# Patient Record
Sex: FEMALE | Race: WHITE | NOT HISPANIC OR LATINO | Employment: OTHER | URBAN - NONMETROPOLITAN AREA
[De-identification: names, ages, dates, MRNs, and addresses within clinical notes are randomized per-mention and may not be internally consistent; named-entity substitution may affect disease eponyms.]

---

## 2017-05-13 PROBLEM — Z88.9 DRUG ALLERGY, MULTIPLE: Status: ACTIVE | Noted: 2017-05-13

## 2017-05-13 PROBLEM — R19.7 DIARRHEA: Status: ACTIVE | Noted: 2017-05-13

## 2017-08-18 PROBLEM — R79.89 ABNORMAL TSH: Status: ACTIVE | Noted: 2017-08-18

## 2017-08-18 PROBLEM — I25.2 HISTORY OF MI (MYOCARDIAL INFARCTION): Status: ACTIVE | Noted: 2017-08-18

## 2017-08-18 PROBLEM — E55.9 VITAMIN D INSUFFICIENCY: Status: ACTIVE | Noted: 2017-08-18

## 2017-11-15 PROBLEM — F02.80 LATE ONSET ALZHEIMER'S DISEASE WITHOUT BEHAVIORAL DISTURBANCE (HCC): Status: ACTIVE | Noted: 2017-11-15

## 2017-11-15 PROBLEM — G30.1 LATE ONSET ALZHEIMER'S DISEASE WITHOUT BEHAVIORAL DISTURBANCE (HCC): Status: ACTIVE | Noted: 2017-11-15

## 2018-02-06 PROBLEM — M19.90 OSTEOARTHRITIS: Status: ACTIVE | Noted: 2018-02-06

## 2018-03-16 ENCOUNTER — OFFICE VISIT (OUTPATIENT)
Dept: CARDIOLOGY | Facility: CLINIC | Age: 83
End: 2018-03-16
Payer: MEDICARE

## 2018-03-16 VITALS
SYSTOLIC BLOOD PRESSURE: 130 MMHG | HEART RATE: 82 BPM | DIASTOLIC BLOOD PRESSURE: 60 MMHG | WEIGHT: 117 LBS | OXYGEN SATURATION: 93 % | HEIGHT: 64 IN | BODY MASS INDEX: 19.97 KG/M2

## 2018-03-16 DIAGNOSIS — R00.2 PALPITATIONS: ICD-10-CM

## 2018-03-16 DIAGNOSIS — F17.210 CIGARETTE SMOKER: ICD-10-CM

## 2018-03-16 DIAGNOSIS — I10 ESSENTIAL HYPERTENSION: ICD-10-CM

## 2018-03-16 PROCEDURE — 99406 BEHAV CHNG SMOKING 3-10 MIN: CPT | Performed by: INTERNAL MEDICINE

## 2018-03-16 PROCEDURE — 93000 ELECTROCARDIOGRAM COMPLETE: CPT | Performed by: INTERNAL MEDICINE

## 2018-03-16 PROCEDURE — 99205 OFFICE O/P NEW HI 60 MIN: CPT | Mod: 25 | Performed by: INTERNAL MEDICINE

## 2018-03-16 ASSESSMENT — ENCOUNTER SYMPTOMS
LOSS OF CONSCIOUSNESS: 0
PND: 0
DEPRESSION: 0
SHORTNESS OF BREATH: 0
PALPITATIONS: 1
ORTHOPNEA: 0
FALLS: 0
ABDOMINAL PAIN: 0
DIZZINESS: 0
DIAPHORESIS: 1

## 2018-03-16 NOTE — LETTER
"     [facility] [address],   [address]nd [place] [address]  Phone: [phone]  Fax: [phone]              Brittney Bruno  11/23/1933    Encounter Date: 3/16/2018    Brandi Raymundo M.D.          PROGRESS NOTE:  Subjective:   Brittney Bruno is a 84 y.o. female referred to cardiology clinic for management of her hypertension. Patient reports having sudden diaphoretic episodes which she used to have previously when her blood pressure was uncontrolled. She was seen at the ER recently once for abdominal discomfort and the other time for infectious symptoms. At that time her blood pressure was in the 160s systolic. She was told that her antihypertensives were not working. Since then her blood pressure at home has been mostly 120s systolic, occasionally 130s to 140s systolic. She is requesting that her metoprolol be changed to another medication.    She has a history of an MI in 2004 at which time she had a stent placed to the RCA.  No chest discomfort.    She is a current smoker and has been smoking for 67 years. Currently smokes 3 cigarettes a day and is not interested in quitting at this time.    When she has the above-mentioned diaphoresis, she reports not feeling well. Possible palpitations. No dyspnea. No clear triggers for her symptoms. No alleviating factors.    She has hyperlipidemia and is intolerant to statins.     Chief Complaint   Patient presents with   • New Patient     wants to change b/p meds         Past Medical History:   Diagnosis Date   • Arthritis     degenerative osteoarthritis and osteoporosis   • CATARACT 2001    bilateral IOL   • Chronic UTI 11/7/2012   • Dental disorder     dentures   • Heart valve disease     \"leaky valves\"   • Hepatitis C    • Hypertension    • Myocardial infarct 2002   • Pain     neck and back pain   • Renal disorder 2006    stone   • Sciatica 11/7/2012     Past Surgical History:   Procedure " Laterality Date   • CERVICAL DISK AND FUSION ANTERIOR  9/15/2010    Performed by TERESA MALDONADO at SURGERY [facility] ORS   • BUNIONECTOMY  1989    left   • TUBAL REANASTOMOSIS  1968    unsuccessful   • TUBAL COAGULATION LAPAROSCOPIC BILATERAL  1963   • TONSILLECTOMY  1955   • COLON RESECTION     • OTHER      colon tumors removed   • OTHER ABDOMINAL SURGERY  1999, 1998    hernia   • OTHER ORTHOPEDIC SURGERY       Family History   Problem Relation Age of Onset   • Stroke Mother    • Cancer Sister      History   Smoking Status   • Current Every Day Smoker   • Packs/day: 0.25   • Types: Cigarettes   Smokeless Tobacco   • Never Used     Allergies   Allergen Reactions   • Amitriptyline Rash, Itching and Vomiting   • Darvocet [Propoxyphene N-Apap] Rash, Itching and Vomiting   • Demerol Rash, Itching and Vomiting   • Fentanyl Itching   • Morphine Rash, Itching and Vomiting   • Penicillin G Swelling   • Percocet [Oxycodone-Acetaminophen] Itching and Vomiting   • Sulfamethoxazole W-Trimethoprim Anaphylaxis   • Ultracet [Tramadol-Acetaminophen] Itching and Vomiting   • Ultram [Tramadol Hcl] Rash, Itching and Vomiting   • Vicodin [Hydrocodone-Acetaminophen] Rash, Itching and Vomiting   • Fosamax      Jaw problems   • Codeine Itching   • Dilaudid [Hydromorphone Hcl] Itching   • Imodium [Loperamide] Diarrhea     Upset stomach and diarrhea   • Milk Products Food Allergy    • Motrin [Ibuprofen]    • Sulfa Drugs      Outpatient Encounter Prescriptions as of 3/16/2018   Medication Sig Dispense Refill   • cetirizine-psuedoephedrine (ZYRTEC-D ALLERGY & CONGESTION) 5-120 MG per tablet Take 1 Tab by mouth 2 times a day. 30 Tab 0   • busPIRone (BUSPAR) 10 MG Tab TAKE 1 TABLET BY MOUTH TWICE DAILY 60 Tab 2   • VOLTAREN 1 % Gel Apply 2 to 4 grams to affected area 4 times daily 200 g 5   • lidocaine (LIDODERM) 5 % Patch Apply 1 Patch to skin as directed every 24 hours. 30 Patch 3   • sertraline (ZOLOFT) 50 MG Tab Take 1 Tab by mouth every  "day. 30 Tab 5   • levothyroxine (SYNTHROID) 100 MCG Tab Take  by mouth Every morning on an empty stomach.     • ondansetron (ZOFRAN ODT) 4 MG TABLET DISPERSIBLE Take 1 Tab by mouth every four hours as needed for Nausea. 12 Tab 1   • donepezil (ARICEPT) 10 MG tablet Take 10 mg by mouth every evening.     • metoprolol SR (TOPROL XL) 25 MG TABLET SR 24 HR TAKE 1/2 TABLET BY MOUTH TWICE DAILY 90 Tab 0   • calcitonin, salmon, (MIACALCIN) 200 UNIT/ACT Solution Spray 1 spray in alternating nostrils every other day 3 Bottle 3   • trazodone (DESYREL) 150 MG Tab TAKE 1 TABLET BY MOUTH EVERY NIGHT AT BEDTIME 30 Tab 5   • aspirin EC (ECOTRIN) 325 MG Tablet Delayed Response Take 325 mg by mouth every day.     • [DISCONTINUED] donepezil (ARICEPT) 10 MG tablet 1 q evening 30 Tab 11     No facility-administered encounter medications on file as of 3/16/2018.      Review of Systems   Constitutional: Positive for diaphoresis. Negative for malaise/fatigue.   Respiratory: Negative for shortness of breath.    Cardiovascular: Positive for palpitations. Negative for chest pain, orthopnea, leg swelling and PND.   Gastrointestinal: Negative for abdominal pain.   Musculoskeletal: Negative for falls.   Skin: Negative.    Neurological: Negative for dizziness and loss of consciousness.   Psychiatric/Behavioral: Negative for depression.   All other systems reviewed and are negative.       Objective:   /60   Pulse 82   Ht 1.626 m (5' 4\")   Wt 53.1 kg (117 lb)   LMP  (LMP Unknown)   SpO2 93%   BMI 20.08 kg/m²      Physical Exam   Constitutional: She is oriented to person, place, and time. She appears well-developed and well-nourished.   HENT:   Head: Normocephalic and atraumatic.   Eyes: Conjunctivae are normal.   Neck: Normal range of motion. Neck supple.   Cardiovascular: Normal rate, regular rhythm and normal heart sounds.  Exam reveals no gallop and no friction rub.    No murmur heard.  Pulmonary/Chest: Effort normal and breath " "sounds normal. She has no wheezes. She has no rales.   Abdominal: Soft. There is no tenderness.   Musculoskeletal: She exhibits no edema.   Neurological: She is alert and oriented to person, place, and time.   Skin: Skin is warm and dry.   Psychiatric: She has a normal mood and affect.   Nursing note and vitals reviewed.    EKG performed today was reviewed and shows normal sinus rhythm at 60 beats per minute. Nonspecific ST-T wave changes.    Labs performed in March 2018 was reviewed and showed hemoglobin 14.4. Creatinine 1.0.    Assessment:     1. Essential hypertension  ECHOCARDIOGRAM COMP W/O CONT   2. Palpitations  EKG   3. Cigarette smoker         Medical Decision Making:  Today's Assessment / Status / Plan:     Hypertension: Patient's blood pressure is at goal today and has been since her discharge from the ER. I suspect her elevated blood pressures were secondary to being in pain and having an active infection. I explained this to the patient and the patient got extremely upset. She demanded that her medication be changed as she thinks metoprolol has stopped working. I explained to the patient that her blood pressure has been at goal and there is no indication to change her blood pressure medication at this time. At this point the patient said \"if something goes wrong, I will blame you\". I tried to clarify what the patient meant however the patient did not specify. At this time I encouraged the patient to get a second opinion since she clearly disagreed with my medical opinion. Patient however said that she was willing to not change her medication at this time. I have advised her to monitor her blood pressures every day for the next month. She should bring this log with her to the next appointment and we will discuss in detail whether or not any of her medications need to be changed. She will continue metoprolol at current dose for now.    Diaphoresis, possible palpitations/feeling unwell: Unclear etiology of " patient's symptoms. I will refer her for an echocardiogram to evaluate her LV function and for valvular pathology. May need to consider ambulatory EKG monitoring as the next step if the patient continues to have these symptoms.    Tobacco use: I strongly encouraged the patient to quit smoking. She however is not interested in quitting at this time. I spent 3 minutes discussing the benefits of smoking cessation.    Return to clinic in 2 months or earlier if needed.    Thank you for allowing me to participate in the care of this patient. Please do not hesitate to contact me with any questions.    Brandi Raymundo MD  Cardiologist  [facility]      PLEASE NOTE: This dictation was created using voice recognition software.               SHA Dumont.-ANDERSON.  [address]  [place] [address]  VIA In Basket

## 2018-03-16 NOTE — PROGRESS NOTES
"Subjective:   Brittney Bruno is a 84 y.o. female referred to cardiology clinic for management of her hypertension. Patient reports having sudden diaphoretic episodes which she used to have previously when her blood pressure was uncontrolled. She was seen at the ER recently once for abdominal discomfort and the other time for infectious symptoms. At that time her blood pressure was in the 160s systolic. She was told that her antihypertensives were not working. Since then her blood pressure at home has been mostly 120s systolic, occasionally 130s to 140s systolic. She is requesting that her metoprolol be changed to another medication.    She has a history of an MI in 2004 at which time she had a stent placed to the RCA.  No chest discomfort.    She is a current smoker and has been smoking for 67 years. Currently smokes 3 cigarettes a day and is not interested in quitting at this time.    When she has the above-mentioned diaphoresis, she reports not feeling well. Possible palpitations. No dyspnea. No clear triggers for her symptoms. No alleviating factors.    She has hyperlipidemia and is intolerant to statins.     Chief Complaint   Patient presents with   • New Patient     wants to change b/p meds         Past Medical History:   Diagnosis Date   • Arthritis     degenerative osteoarthritis and osteoporosis   • CATARACT 2001    bilateral IOL   • Chronic UTI 11/7/2012   • Dental disorder     dentures   • Heart valve disease     \"leaky valves\"   • Hepatitis C    • Hypertension    • Myocardial infarct 2002   • Pain     neck and back pain   • Renal disorder 2006    stone   • Sciatica 11/7/2012     Past Surgical History:   Procedure Laterality Date   • CERVICAL DISK AND FUSION ANTERIOR  9/15/2010    Performed by TERESA MALDONADO at SURGERY [facility] ORS   • BUNIONECTOMY  1989    left   • TUBAL REANASTOMOSIS  1968    unsuccessful   • TUBAL COAGULATION LAPAROSCOPIC BILATERAL  1963   • TONSILLECTOMY  1955   • COLON " RESECTION     • OTHER      colon tumors removed   • OTHER ABDOMINAL SURGERY  1999, 1998    hernia   • OTHER ORTHOPEDIC SURGERY       Family History   Problem Relation Age of Onset   • Stroke Mother    • Cancer Sister      History   Smoking Status   • Current Every Day Smoker   • Packs/day: 0.25   • Types: Cigarettes   Smokeless Tobacco   • Never Used     Allergies   Allergen Reactions   • Amitriptyline Rash, Itching and Vomiting   • Darvocet [Propoxyphene N-Apap] Rash, Itching and Vomiting   • Demerol Rash, Itching and Vomiting   • Fentanyl Itching   • Morphine Rash, Itching and Vomiting   • Penicillin G Swelling   • Percocet [Oxycodone-Acetaminophen] Itching and Vomiting   • Sulfamethoxazole W-Trimethoprim Anaphylaxis   • Ultracet [Tramadol-Acetaminophen] Itching and Vomiting   • Ultram [Tramadol Hcl] Rash, Itching and Vomiting   • Vicodin [Hydrocodone-Acetaminophen] Rash, Itching and Vomiting   • Fosamax      Jaw problems   • Codeine Itching   • Dilaudid [Hydromorphone Hcl] Itching   • Imodium [Loperamide] Diarrhea     Upset stomach and diarrhea   • Milk Products Food Allergy    • Motrin [Ibuprofen]    • Sulfa Drugs      Outpatient Encounter Prescriptions as of 3/16/2018   Medication Sig Dispense Refill   • cetirizine-psuedoephedrine (ZYRTEC-D ALLERGY & CONGESTION) 5-120 MG per tablet Take 1 Tab by mouth 2 times a day. 30 Tab 0   • busPIRone (BUSPAR) 10 MG Tab TAKE 1 TABLET BY MOUTH TWICE DAILY 60 Tab 2   • VOLTAREN 1 % Gel Apply 2 to 4 grams to affected area 4 times daily 200 g 5   • lidocaine (LIDODERM) 5 % Patch Apply 1 Patch to skin as directed every 24 hours. 30 Patch 3   • sertraline (ZOLOFT) 50 MG Tab Take 1 Tab by mouth every day. 30 Tab 5   • levothyroxine (SYNTHROID) 100 MCG Tab Take  by mouth Every morning on an empty stomach.     • ondansetron (ZOFRAN ODT) 4 MG TABLET DISPERSIBLE Take 1 Tab by mouth every four hours as needed for Nausea. 12 Tab 1   • donepezil (ARICEPT) 10 MG tablet Take 10 mg by mouth  "every evening.     • metoprolol SR (TOPROL XL) 25 MG TABLET SR 24 HR TAKE 1/2 TABLET BY MOUTH TWICE DAILY 90 Tab 0   • calcitonin, salmon, (MIACALCIN) 200 UNIT/ACT Solution Spray 1 spray in alternating nostrils every other day 3 Bottle 3   • trazodone (DESYREL) 150 MG Tab TAKE 1 TABLET BY MOUTH EVERY NIGHT AT BEDTIME 30 Tab 5   • aspirin EC (ECOTRIN) 325 MG Tablet Delayed Response Take 325 mg by mouth every day.     • [DISCONTINUED] donepezil (ARICEPT) 10 MG tablet 1 q evening 30 Tab 11     No facility-administered encounter medications on file as of 3/16/2018.      Review of Systems   Constitutional: Positive for diaphoresis. Negative for malaise/fatigue.   Respiratory: Negative for shortness of breath.    Cardiovascular: Positive for palpitations. Negative for chest pain, orthopnea, leg swelling and PND.   Gastrointestinal: Negative for abdominal pain.   Musculoskeletal: Negative for falls.   Skin: Negative.    Neurological: Negative for dizziness and loss of consciousness.   Psychiatric/Behavioral: Negative for depression.   All other systems reviewed and are negative.       Objective:   /60   Pulse 82   Ht 1.626 m (5' 4\")   Wt 53.1 kg (117 lb)   LMP  (LMP Unknown)   SpO2 93%   BMI 20.08 kg/m²     Physical Exam   Constitutional: She is oriented to person, place, and time. She appears well-developed and well-nourished.   HENT:   Head: Normocephalic and atraumatic.   Eyes: Conjunctivae are normal.   Neck: Normal range of motion. Neck supple.   Cardiovascular: Normal rate, regular rhythm and normal heart sounds.  Exam reveals no gallop and no friction rub.    No murmur heard.  Pulmonary/Chest: Effort normal and breath sounds normal. She has no wheezes. She has no rales.   Abdominal: Soft. There is no tenderness.   Musculoskeletal: She exhibits no edema.   Neurological: She is alert and oriented to person, place, and time.   Skin: Skin is warm and dry.   Psychiatric: She has a normal mood and affect. " "  Nursing note and vitals reviewed.    EKG performed today was reviewed and shows normal sinus rhythm at 60 beats per minute. Nonspecific ST-T wave changes.    Labs performed in March 2018 was reviewed and showed hemoglobin 14.4. Creatinine 1.0.    Assessment:     1. Essential hypertension  ECHOCARDIOGRAM COMP W/O CONT   2. Palpitations  EKG   3. Cigarette smoker         Medical Decision Making:  Today's Assessment / Status / Plan:     Hypertension: Patient's blood pressure is at goal today and has been since her discharge from the ER. I suspect her elevated blood pressures were secondary to being in pain and having an active infection. I explained this to the patient and the patient got extremely upset. She demanded that her medication be changed as she thinks metoprolol has stopped working. I explained to the patient that her blood pressure has been at goal and there is no indication to change her blood pressure medication at this time. At this point the patient said \"if something goes wrong, I will blame you\". I tried to clarify what the patient meant however the patient did not specify. At this time I encouraged the patient to get a second opinion since she clearly disagreed with my medical opinion. Patient however said that she was willing to not change her medication at this time. I have advised her to monitor her blood pressures every day for the next month. She should bring this log with her to the next appointment and we will discuss in detail whether or not any of her medications need to be changed. She will continue metoprolol at current dose for now.    Diaphoresis, possible palpitations/feeling unwell: Unclear etiology of patient's symptoms. I will refer her for an echocardiogram to evaluate her LV function and for valvular pathology. May need to consider ambulatory EKG monitoring as the next step if the patient continues to have these symptoms.    Tobacco use: I strongly encouraged the patient to quit " smoking. She however is not interested in quitting at this time. I spent 3 minutes discussing the benefits of smoking cessation.    Return to clinic in 2 months or earlier if needed.    Thank you for allowing me to participate in the care of this patient. Please do not hesitate to contact me with any questions.    Brandi Raymundo MD  Cardiologist  [facility]      PLEASE NOTE: This dictation was created using voice recognition software.

## 2018-03-18 LAB — EKG IMPRESSION: NORMAL

## 2018-03-29 PROBLEM — R19.7 DIARRHEA: Chronic | Status: ACTIVE | Noted: 2017-05-13

## 2018-09-11 PROBLEM — R19.7 DIARRHEA: Status: RESOLVED | Noted: 2017-05-13 | Resolved: 2018-09-11

## 2018-09-11 PROBLEM — K52.9 CHRONIC DIARRHEA: Status: ACTIVE | Noted: 2018-09-11

## 2018-09-28 ENCOUNTER — OFFICE VISIT (OUTPATIENT)
Dept: CARDIOLOGY | Facility: CLINIC | Age: 83
End: 2018-09-28
Payer: MEDICARE

## 2018-09-28 VITALS
SYSTOLIC BLOOD PRESSURE: 110 MMHG | HEART RATE: 72 BPM | DIASTOLIC BLOOD PRESSURE: 58 MMHG | WEIGHT: 118 LBS | BODY MASS INDEX: 20.14 KG/M2 | OXYGEN SATURATION: 92 % | HEIGHT: 64 IN

## 2018-09-28 DIAGNOSIS — I25.10 CORONARY ARTERY DISEASE INVOLVING NATIVE CORONARY ARTERY OF NATIVE HEART WITHOUT ANGINA PECTORIS: ICD-10-CM

## 2018-09-28 DIAGNOSIS — F17.210 CIGARETTE SMOKER: ICD-10-CM

## 2018-09-28 DIAGNOSIS — I10 ESSENTIAL HYPERTENSION: ICD-10-CM

## 2018-09-28 DIAGNOSIS — E78.49 OTHER HYPERLIPIDEMIA: ICD-10-CM

## 2018-09-28 PROCEDURE — 99406 BEHAV CHNG SMOKING 3-10 MIN: CPT | Mod: 25 | Performed by: INTERNAL MEDICINE

## 2018-09-28 PROCEDURE — 99214 OFFICE O/P EST MOD 30 MIN: CPT | Performed by: INTERNAL MEDICINE

## 2018-09-28 RX ORDER — LEVOTHYROXINE SODIUM 0.1 MG/1
100 TABLET ORAL
COMMUNITY
End: 2018-12-05

## 2018-09-28 RX ORDER — GABAPENTIN 100 MG/1
CAPSULE ORAL
Refills: 0 | COMMUNITY
Start: 2018-09-25 | End: 2019-03-28 | Stop reason: SDUPTHER

## 2018-09-28 ASSESSMENT — ENCOUNTER SYMPTOMS
LOSS OF CONSCIOUSNESS: 0
ABDOMINAL PAIN: 0
DEPRESSION: 0
DIZZINESS: 0
FALLS: 0
PND: 0
PALPITATIONS: 0
ORTHOPNEA: 0
DIAPHORESIS: 0
SHORTNESS OF BREATH: 0

## 2018-09-28 NOTE — LETTER
"     [facility] [address],   [address]nd [place] [address]  Phone: [phone]  Fax: [phone]              Brittney Bruno  11/23/1933    Encounter Date: 9/28/2018    Brandi Raymundo M.D.          PROGRESS NOTE:  Subjective:   Brittney Bruno is a 84 y.o. female presenting to clinic for follow-up on her coronary artery disease.    Pertinent history:  Coronary artery disease status post PCI to the RCA 2004  Hypertension  Hyperlipidemia, intolerant to statins  Tobacco use    Patient denies any anginal symptoms.  Her main concern today is her aspirin dosing.  She was previously taking 325 mg twice daily.  Her primary care physician reduce it to 81 mg.  Patient is concerned that she was told by a neurologist at [facility] that she needs to be on a full dose aspirin after a \"stroke in the eye\" about 6 years ago.  She wants to know if it is safe to reduce her dose to 81 mg daily.  Her blood pressure is usually well controlled, like today.  She is intolerant to statins.  She has been watching her diet however.  Continues to smoke unfortunately.    Past Medical History:   Diagnosis Date   • Arthritis     degenerative osteoarthritis and osteoporosis   • CATARACT 2001    bilateral IOL   • Chronic UTI 11/7/2012   • Dental disorder     dentures   • Heart valve disease     \"leaky valves\"   • Hepatitis C    • Hypertension    • Myocardial infarct (HCC) 2002   • Pain     neck and back pain   • Renal disorder 2006    stone   • Sciatica 11/7/2012     Past Surgical History:   Procedure Laterality Date   • CERVICAL DISK AND FUSION ANTERIOR  9/15/2010    Performed by TERESA MALDONADO at SURGERY [facility] ORS   • BUNIONECTOMY  1989    left   • TUBAL REANASTOMOSIS  1968    unsuccessful   • TUBAL COAGULATION LAPAROSCOPIC BILATERAL  1963   • TONSILLECTOMY  1955   • COLON RESECTION     • OTHER      colon tumors removed   • OTHER ABDOMINAL SURGERY  1999, 1998   " hernia   • OTHER ORTHOPEDIC SURGERY       Family History   Problem Relation Age of Onset   • Stroke Mother    • Cancer Sister      History   Smoking Status   • Current Every Day Smoker   • Packs/day: 0.25   • Types: Cigarettes   Smokeless Tobacco   • Never Used     Allergies   Allergen Reactions   • Amitriptyline Rash, Itching and Vomiting   • Darvocet [Propoxyphene N-Apap] Rash, Itching and Vomiting   • Demerol Rash, Itching and Vomiting   • Fentanyl Itching   • Morphine Rash, Itching and Vomiting   • Penicillin G Swelling   • Percocet [Oxycodone-Acetaminophen] Itching and Vomiting   • Sulfamethoxazole W-Trimethoprim Anaphylaxis   • Ultracet [Tramadol-Acetaminophen] Itching and Vomiting   • Ultram [Tramadol Hcl] Rash, Itching and Vomiting   • Vicodin [Hydrocodone-Acetaminophen] Rash, Itching and Vomiting   • Fosamax      Jaw problems   • Codeine Itching   • Dilaudid [Hydromorphone Hcl] Itching   • Imodium [Loperamide] Diarrhea     Upset stomach and diarrhea   • Milk Products Food Allergy    • Motrin [Ibuprofen]    • Sulfa Drugs      Outpatient Encounter Prescriptions as of 9/28/2018   Medication Sig Dispense Refill   • gabapentin (NEURONTIN) 100 MG Cap TAKE 1 CAPLET PO BID FOR NERVE PAIN  0   • clonazePAM (KLONOPIN) 0.5 MG Tab Take 1 Tab by mouth 2 times a day as needed for up to 30 days. 60 Tab 0   • metoprolol SR (TOPROL XL) 25 MG TABLET SR 24 HR Take 1 Tab by mouth every day. 30 Tab 5   • sertraline (ZOLOFT) 50 MG Tab TAKE 1 TABLET BY MOUTH EVERY DAY 30 Tab 5   • busPIRone (BUSPAR) 10 MG Tab TAKE 1 TABLET BY MOUTH TWICE DAILY 60 Tab 2   • VOLTAREN 1 % Gel Apply 2 to 4 grams to affected area 4 times daily 200 g 5   • calcitonin, salmon, (MIACALCIN) 200 UNIT/ACT Solution Spray 1 spray in alternating nostrils every other day 3 Bottle 3   • aspirin EC (ECOTRIN) 325 MG Tablet Delayed Response Take 325 mg by mouth every day.     • levothyroxine (SYNTHROID) 100 MCG Tab Take 100 mcg by mouth Every morning on an empty  "stomach.     • Lido-Capsaicin-Men-Methyl Sal (MEDI-PATCH-LIDOCAINE EX) by Apply externally route.     • [DISCONTINUED] lisinopril (PRINIVIL) 20 MG Tab Take 1 Tab by mouth every day. 30 Tab 0   • [DISCONTINUED] GABAPENTIN PO Take  by mouth.     • traZODone (DESYREL) 150 MG Tab TAKE 1 TABLET BY MOUTH EVERY NIGHT AT BEDTIME 30 Tab 5     No facility-administered encounter medications on file as of 9/28/2018.      Review of Systems   Constitutional: Negative for diaphoresis and malaise/fatigue.   Respiratory: Negative for shortness of breath.    Cardiovascular: Negative for chest pain, palpitations, orthopnea, leg swelling and PND.   Gastrointestinal: Negative for abdominal pain.   Musculoskeletal: Negative for falls.   Skin: Negative.    Neurological: Negative for dizziness and loss of consciousness.   Psychiatric/Behavioral: Negative for depression.   All other systems reviewed and are negative.       Objective:   /58 (BP Location: Right arm, Patient Position: Sitting)   Pulse 72   Ht 1.626 m (5' 4\")   Wt 53.5 kg (118 lb)   LMP  (LMP Unknown)   SpO2 92%   BMI 20.25 kg/m²      Physical Exam   Constitutional: She is oriented to person, place, and time. She appears well-developed and well-nourished.   HENT:   Head: Normocephalic and atraumatic.   Eyes: Conjunctivae are normal.   Neck: Normal range of motion. Neck supple.   Cardiovascular: Normal rate, regular rhythm and normal heart sounds.  Exam reveals no gallop and no friction rub.    No murmur heard.  Pulmonary/Chest: Effort normal and breath sounds normal. She has no wheezes. She has no rales.   Abdominal: Soft. There is no tenderness.   Musculoskeletal: She exhibits no edema.   Neurological: She is alert and oriented to person, place, and time.   Skin: Skin is warm and dry.   Psychiatric: She has a normal mood and affect.   Nursing note and vitals reviewed.    Labs performed in September 2018 were reviewed and showed hemoglobin 15.3.  Creatinine " 1.1.    Assessment:     1. Essential hypertension     2. Cigarette smoker     3. Coronary artery disease involving native coronary artery of native heart without angina pectoris     4. Other hyperlipidemia  LIPID PROFILE       Medical Decision Making:  Today's Assessment / Status / Plan:     Hypertension: Blood pressure is at goal.  Continue metoprolol at current dose.    Coronary artery disease: Status post PCI.  Patient is free of anginal symptoms.  Continue aspirin and metoprolol.  Since the patient reports a history of a possible CVA, it is not unreasonable to have her continue the full dose aspirin.  However I cannot find any neurology note suggesting the above.    Hyperlipidemia: Patient is intolerant to statins.  Continue dietary modification.  lipid panel was ordered today.    Tobacco use: Patient continues to smoke on a daily basis.  I discussed the risks and benefits of ongoing smoking.  Patient does not have any intention of quitting at this time.  I spent 4 minutes discussing smoking cessation.    Return to clinic in 1 year or earlier if needed.    Thank you for allowing me to participate in the care of this patient. Please do not hesitate to contact me with any questions.    Brandi Raymundo MD  Cardiologist  [facility]      PLEASE NOTE: This dictation was created using voice recognition software.               Altagracia Olson, P.A.-C.  [address]  [place] [address]  VIA In Basket

## 2018-09-28 NOTE — PROGRESS NOTES
"Subjective:   Brittney Bruno is a 84 y.o. female presenting to clinic for follow-up on her coronary artery disease.    Pertinent history:  Coronary artery disease status post PCI to the RCA 2004  Hypertension  Hyperlipidemia, intolerant to statins  Tobacco use    Patient denies any anginal symptoms.  Her main concern today is her aspirin dosing.  She was previously taking 325 mg twice daily.  Her primary care physician reduce it to 81 mg.  Patient is concerned that she was told by a neurologist at [facility] that she needs to be on a full dose aspirin after a \"stroke in the eye\" about 6 years ago.  She wants to know if it is safe to reduce her dose to 81 mg daily.  Her blood pressure is usually well controlled, like today.  She is intolerant to statins.  She has been watching her diet however.  Continues to smoke unfortunately.    Past Medical History:   Diagnosis Date   • Arthritis     degenerative osteoarthritis and osteoporosis   • CATARACT 2001    bilateral IOL   • Chronic UTI 11/7/2012   • Dental disorder     dentures   • Heart valve disease     \"leaky valves\"   • Hepatitis C    • Hypertension    • Myocardial infarct (HCC) 2002   • Pain     neck and back pain   • Renal disorder 2006    stone   • Sciatica 11/7/2012     Past Surgical History:   Procedure Laterality Date   • CERVICAL DISK AND FUSION ANTERIOR  9/15/2010    Performed by TERESA MALDONADO at SURGERY [facility] ORS   • BUNIONECTOMY  1989    left   • TUBAL REANASTOMOSIS  1968    unsuccessful   • TUBAL COAGULATION LAPAROSCOPIC BILATERAL  1963   • TONSILLECTOMY  1955   • COLON RESECTION     • OTHER      colon tumors removed   • OTHER ABDOMINAL SURGERY  1999, 1998    hernia   • OTHER ORTHOPEDIC SURGERY       Family History   Problem Relation Age of Onset   • Stroke Mother    • Cancer Sister      History   Smoking Status   • Current Every Day Smoker   • Packs/day: 0.25   • Types: Cigarettes   Smokeless Tobacco   • Never Used     Allergies   Allergen " Reactions   • Amitriptyline Rash, Itching and Vomiting   • Darvocet [Propoxyphene N-Apap] Rash, Itching and Vomiting   • Demerol Rash, Itching and Vomiting   • Fentanyl Itching   • Morphine Rash, Itching and Vomiting   • Penicillin G Swelling   • Percocet [Oxycodone-Acetaminophen] Itching and Vomiting   • Sulfamethoxazole W-Trimethoprim Anaphylaxis   • Ultracet [Tramadol-Acetaminophen] Itching and Vomiting   • Ultram [Tramadol Hcl] Rash, Itching and Vomiting   • Vicodin [Hydrocodone-Acetaminophen] Rash, Itching and Vomiting   • Fosamax      Jaw problems   • Codeine Itching   • Dilaudid [Hydromorphone Hcl] Itching   • Imodium [Loperamide] Diarrhea     Upset stomach and diarrhea   • Milk Products Food Allergy    • Motrin [Ibuprofen]    • Sulfa Drugs      Outpatient Encounter Prescriptions as of 9/28/2018   Medication Sig Dispense Refill   • gabapentin (NEURONTIN) 100 MG Cap TAKE 1 CAPLET PO BID FOR NERVE PAIN  0   • clonazePAM (KLONOPIN) 0.5 MG Tab Take 1 Tab by mouth 2 times a day as needed for up to 30 days. 60 Tab 0   • metoprolol SR (TOPROL XL) 25 MG TABLET SR 24 HR Take 1 Tab by mouth every day. 30 Tab 5   • sertraline (ZOLOFT) 50 MG Tab TAKE 1 TABLET BY MOUTH EVERY DAY 30 Tab 5   • busPIRone (BUSPAR) 10 MG Tab TAKE 1 TABLET BY MOUTH TWICE DAILY 60 Tab 2   • VOLTAREN 1 % Gel Apply 2 to 4 grams to affected area 4 times daily 200 g 5   • calcitonin, salmon, (MIACALCIN) 200 UNIT/ACT Solution Spray 1 spray in alternating nostrils every other day 3 Bottle 3   • aspirin EC (ECOTRIN) 325 MG Tablet Delayed Response Take 325 mg by mouth every day.     • levothyroxine (SYNTHROID) 100 MCG Tab Take 100 mcg by mouth Every morning on an empty stomach.     • Lido-Capsaicin-Men-Methyl Sal (MEDI-PATCH-LIDOCAINE EX) by Apply externally route.     • [DISCONTINUED] lisinopril (PRINIVIL) 20 MG Tab Take 1 Tab by mouth every day. 30 Tab 0   • [DISCONTINUED] GABAPENTIN PO Take  by mouth.     • traZODone (DESYREL) 150 MG Tab TAKE 1  "TABLET BY MOUTH EVERY NIGHT AT BEDTIME 30 Tab 5     No facility-administered encounter medications on file as of 9/28/2018.      Review of Systems   Constitutional: Negative for diaphoresis and malaise/fatigue.   Respiratory: Negative for shortness of breath.    Cardiovascular: Negative for chest pain, palpitations, orthopnea, leg swelling and PND.   Gastrointestinal: Negative for abdominal pain.   Musculoskeletal: Negative for falls.   Skin: Negative.    Neurological: Negative for dizziness and loss of consciousness.   Psychiatric/Behavioral: Negative for depression.   All other systems reviewed and are negative.       Objective:   /58 (BP Location: Right arm, Patient Position: Sitting)   Pulse 72   Ht 1.626 m (5' 4\")   Wt 53.5 kg (118 lb)   LMP  (LMP Unknown)   SpO2 92%   BMI 20.25 kg/m²     Physical Exam   Constitutional: She is oriented to person, place, and time. She appears well-developed and well-nourished.   HENT:   Head: Normocephalic and atraumatic.   Eyes: Conjunctivae are normal.   Neck: Normal range of motion. Neck supple.   Cardiovascular: Normal rate, regular rhythm and normal heart sounds.  Exam reveals no gallop and no friction rub.    No murmur heard.  Pulmonary/Chest: Effort normal and breath sounds normal. She has no wheezes. She has no rales.   Abdominal: Soft. There is no tenderness.   Musculoskeletal: She exhibits no edema.   Neurological: She is alert and oriented to person, place, and time.   Skin: Skin is warm and dry.   Psychiatric: She has a normal mood and affect.   Nursing note and vitals reviewed.    Labs performed in September 2018 were reviewed and showed hemoglobin 15.3.  Creatinine 1.1.    Assessment:     1. Essential hypertension     2. Cigarette smoker     3. Coronary artery disease involving native coronary artery of native heart without angina pectoris     4. Other hyperlipidemia  LIPID PROFILE       Medical Decision Making:  Today's Assessment / Status / Plan: "     Hypertension: Blood pressure is at goal.  Continue metoprolol at current dose.    Coronary artery disease: Status post PCI.  Patient is free of anginal symptoms.  Continue aspirin and metoprolol.  Since the patient reports a history of a possible CVA, it is not unreasonable to have her continue the full dose aspirin.  However I cannot find any neurology note suggesting the above.    Hyperlipidemia: Patient is intolerant to statins.  Continue dietary modification.  lipid panel was ordered today.    Tobacco use: Patient continues to smoke on a daily basis.  I discussed the risks and benefits of ongoing smoking.  Patient does not have any intention of quitting at this time.  I spent 4 minutes discussing smoking cessation.    Return to clinic in 1 year or earlier if needed.    Thank you for allowing me to participate in the care of this patient. Please do not hesitate to contact me with any questions.    Brandi Raymundo MD  Cardiologist  [facility]      PLEASE NOTE: This dictation was created using voice recognition software.

## 2018-10-16 ENCOUNTER — TELEPHONE (OUTPATIENT)
Dept: CARDIOLOGY | Facility: MEDICAL CENTER | Age: 83
End: 2018-10-16

## 2018-10-16 NOTE — TELEPHONE ENCOUNTER
----- Message from Ashtyn Aguirre L.P.N. sent at 10/16/2018  2:33 PM PDT -----      ----- Message -----  From: La Snell R.N.  Sent: 10/12/2018   6:08 PM  To: Kayla Glynn R.N.        ----- Message -----  From: La Snell R.N.  Sent: 10/11/2018   3:58 PM  To: La Snell R.N.        ----- Message -----  From: Brandi Raymundo M.D.  Sent: 10/11/2018  11:51 AM  To: Kayla Glynn R.N.    Lipid panel reviewed. Looks good.   No changes for now.   Thank you   AA

## 2018-12-05 PROBLEM — M54.12 CERVICAL RADICULOPATHY: Status: ACTIVE | Noted: 2018-12-05

## 2018-12-05 PROBLEM — G56.22 CUBITAL TUNNEL SYNDROME ON LEFT: Status: ACTIVE | Noted: 2018-12-05

## 2019-01-31 ENCOUNTER — OFFICE VISIT (OUTPATIENT)
Dept: CARDIOLOGY | Facility: CLINIC | Age: 84
End: 2019-01-31
Payer: MEDICARE

## 2019-01-31 VITALS
WEIGHT: 123 LBS | DIASTOLIC BLOOD PRESSURE: 70 MMHG | BODY MASS INDEX: 21 KG/M2 | OXYGEN SATURATION: 91 % | SYSTOLIC BLOOD PRESSURE: 140 MMHG | HEART RATE: 84 BPM | HEIGHT: 64 IN

## 2019-01-31 DIAGNOSIS — I25.10 CORONARY ARTERY DISEASE INVOLVING NATIVE CORONARY ARTERY OF NATIVE HEART WITHOUT ANGINA PECTORIS: ICD-10-CM

## 2019-01-31 DIAGNOSIS — F17.210 CIGARETTE SMOKER: ICD-10-CM

## 2019-01-31 DIAGNOSIS — I72.1 BRACHIAL ARTERY ANEURYSM (HCC): ICD-10-CM

## 2019-01-31 DIAGNOSIS — Z01.810 PREOP CARDIOVASCULAR EXAM: ICD-10-CM

## 2019-01-31 DIAGNOSIS — Z79.899 ENCOUNTER FOR LONG-TERM (CURRENT) USE OF HIGH-RISK MEDICATION: ICD-10-CM

## 2019-01-31 DIAGNOSIS — Z01.810 PRE-OPERATIVE CARDIOVASCULAR EXAMINATION: ICD-10-CM

## 2019-01-31 DIAGNOSIS — I10 ESSENTIAL HYPERTENSION: ICD-10-CM

## 2019-01-31 LAB — EKG IMPRESSION: NORMAL

## 2019-01-31 PROCEDURE — 99214 OFFICE O/P EST MOD 30 MIN: CPT | Mod: 25 | Performed by: INTERNAL MEDICINE

## 2019-01-31 PROCEDURE — 93000 ELECTROCARDIOGRAM COMPLETE: CPT | Performed by: INTERNAL MEDICINE

## 2019-01-31 PROCEDURE — 99406 BEHAV CHNG SMOKING 3-10 MIN: CPT | Performed by: INTERNAL MEDICINE

## 2019-01-31 RX ORDER — ASPIRIN 81 MG/1
TABLET, COATED ORAL
Refills: 1 | COMMUNITY
Start: 2019-01-23 | End: 2019-01-31

## 2019-01-31 ASSESSMENT — ENCOUNTER SYMPTOMS
DIZZINESS: 0
DEPRESSION: 0
PALPITATIONS: 0
LOSS OF CONSCIOUSNESS: 0
SHORTNESS OF BREATH: 0
FALLS: 0
ORTHOPNEA: 0
PND: 0
ABDOMINAL PAIN: 0

## 2019-01-31 NOTE — LETTER
"     [facility] [address],   [address] [place] [address]  Phone: [phone]  Fax: [phone]              Brittney Bruno  11/23/1933    Encounter Date: 1/31/2019    Brandi Raymundo M.D.          PROGRESS NOTE:  Chief Complaint   Patient presents with   • HTN (Controlled)   • Preoperative CV Eval       Subjective:   Brittney Bruno is a 85 y.o. female who presents today to follow-up on coronary disease.    Pertinent history:  Coronary artery disease status post PCI to the RCA 2004  Hypertension  Hyperlipidemia, intolerant to statins  Tobacco use    Patient has been doing well.  Denies any anginal symptoms.  Unfortunately she continues to smoke on a daily basis.  States that she has been smoking since the age of 18 and does not have any intention of quitting.    She is having numbness in her left fourth and fifth digit and is scheduled to undergo ulnar nerve surgery and is requesting preoperative for stratification today.  She has been monitoring her diet regularly for hyperlipidemia.    Her main concern today is a \"aneurysm\" that she has in her right elbow.  She has never had any testing performed for this.    Past Medical History:   Diagnosis Date   • Anxiety 11/7/2012   • Arthritis     degenerative osteoarthritis and osteoporosis   • CATARACT 2001    bilateral IOL   • Cervical radiculopathy 12/5/2018   • Chronic back pain     neck and low back pain, now resolved    • Chronic UTI 11/7/2012   • Compression fracture of lumbar vertebra (HCC)    • Cubital tunnel syndrome on left 12/5/2018   • DDD (degenerative disc disease), lumbar    • Dental disorder     dentures   • Drug allergy, multiple 5/13/2017   • Heart valve disease     \"leaky valves\"   • Hepatitis C    • Hypertension    • Insomnia 4/18/2014   • Late onset Alzheimer's disease without behavioral disturbance 11/15/2017   • Myocardial infarct (HCC) 2002   • Osteoarthritis 2/6/2018  "   • Osteoporosis 9/21/2016   • Other hyperlipidemia 9/28/2018   • Renal disorder 2006    stone   • Sciatica 11/7/2012   • Vitamin D insufficiency 8/18/2017     Past Surgical History:   Procedure Laterality Date   • CERVICAL DISK AND FUSION ANTERIOR  9/15/2010    Performed by TERESA MALDONADO at SURGERY [facility]   • BUNIONECTOMY  1989    left   • TUBAL REANASTOMOSIS  1968    unsuccessful   • TUBAL COAGULATION LAPAROSCOPIC BILATERAL  1963   • TONSILLECTOMY  1955   • COLON RESECTION     • OTHER      colon tumors removed   • OTHER ABDOMINAL SURGERY  1999, 1998    hernia   • OTHER ORTHOPEDIC SURGERY       Family History   Problem Relation Age of Onset   • Stroke Mother    • Cancer Sister      Social History     Social History   • Marital status: Single     Spouse name: N/A   • Number of children: N/A   • Years of education: N/A     Occupational History   • Not on file.     Social History Main Topics   • Smoking status: Current Every Day Smoker     Packs/day: 0.25     Types: Cigarettes   • Smokeless tobacco: Never Used   • Alcohol use No   • Drug use: No   • Sexual activity: Not on file     Other Topics Concern   • Not on file     Social History Narrative   • No narrative on file     Allergies   Allergen Reactions   • Amitriptyline Rash, Itching and Vomiting   • Darvocet [Propoxyphene N-Apap] Rash, Itching and Vomiting   • Demerol Rash, Itching and Vomiting   • Fentanyl Itching   • Morphine Rash, Itching and Vomiting   • Penicillin G Swelling   • Percocet [Oxycodone-Acetaminophen] Itching and Vomiting   • Sulfamethoxazole W-Trimethoprim Anaphylaxis   • Ultracet [Tramadol-Acetaminophen] Itching and Vomiting   • Ultram [Tramadol Hcl] Rash, Itching and Vomiting   • Vicodin [Hydrocodone-Acetaminophen] Rash, Itching and Vomiting   • Fosamax      Jaw problems   • Codeine Itching   • Dilaudid [Hydromorphone Hcl] Itching   • Imodium [Loperamide] Diarrhea     Upset stomach and diarrhea   • Milk Products Food Allergy    •  "Motrin [Ibuprofen]    • Sulfa Drugs      Outpatient Encounter Prescriptions as of 1/31/2019   Medication Sig Dispense Refill   • traZODone (DESYREL) 150 MG Tab TAKE 1 TABLET BY MOUTH AT BEDTIME 90 Tab 1   • rosuvastatin (CRESTOR) 5 MG Tab Take 1 Tab by mouth every evening. 90 Tab 1   • aspirin 81 MG tablet Take 1 Tab by mouth every day. 90 Tab 1   • sertraline (ZOLOFT) 50 MG Tab TAKE 1 TABLET BY MOUTH EVERY DAY 90 Tab 1   • Multiple Vitamins-Minerals (OCUVITE-LUTEIN) Tab Take 1 tablet by mouth every day.     • Melatonin 1 MG Cap Take  by mouth.     • calcitonin, salmon, (MIACALCIN) 200 UNIT/ACT Solution SPRAY 1 SPRAY IN ALTERNATING NOSTRILS EVERY OTHER DAY 11.1 mL 2   • gabapentin (NEURONTIN) 100 MG Cap TAKE 1 CAPLET PO BID FOR NERVE PAIN  0   • Lido-Capsaicin-Men-Methyl Sal (MEDI-PATCH-LIDOCAINE EX) by Apply externally route.     • metoprolol SR (TOPROL XL) 25 MG TABLET SR 24 HR Take 1 Tab by mouth every day. 30 Tab 5   • VOLTAREN 1 % Gel Apply 2 to 4 grams to affected area 4 times daily 200 g 5   • [DISCONTINUED] ASPIRIN LOW DOSE 81 MG EC tablet TK 1 T PO  QD  1     No facility-administered encounter medications on file as of 1/31/2019.      Review of Systems   Constitutional: Negative for malaise/fatigue.   Respiratory: Negative for shortness of breath.    Cardiovascular: Negative for chest pain, palpitations, orthopnea, leg swelling and PND.   Gastrointestinal: Negative for abdominal pain.   Musculoskeletal: Negative for falls.   Neurological: Negative for dizziness and loss of consciousness.   Psychiatric/Behavioral: Negative for depression.   All other systems reviewed and are negative.       Objective:   /70 (BP Location: Right arm, Patient Position: Sitting)   Pulse 84   Ht 1.626 m (5' 4\")   Wt 55.8 kg (123 lb)   LMP  (LMP Unknown)   SpO2 91%   BMI 21.11 kg/m²      Physical Exam   Constitutional: She is oriented to person, place, and time. She appears well-developed and well-nourished. No " distress.   HENT:   Head: Normocephalic and atraumatic.   Eyes: Conjunctivae are normal. No scleral icterus.   Neck: Normal range of motion. Neck supple.   Cardiovascular: Normal rate, regular rhythm and normal heart sounds.  Exam reveals no gallop and no friction rub.    No murmur heard.  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  Arterial pulsation is appreciated.   Pulmonary/Chest: Effort normal and breath sounds normal. No respiratory distress. She has no wheezes. She has no rales.   Abdominal: Soft. She exhibits no distension. There is no tenderness.   Musculoskeletal: She exhibits no edema.   Neurological: She is alert and oriented to person, place, and time.   Skin: Skin is warm and dry. She is not diaphoretic.   Psychiatric: She has a normal mood and affect. Her behavior is normal.   Nursing note and vitals reviewed.    Labs performed in January 2019 were reviewed and showed normal creatinine.    EKG performed today was personally reviewed and per my interpretation shows sinus rhythm at 81 bpm.  Nonspecific ST changes.    Assessment:     1. Preop cardiovascular exam  EKG   2. Essential hypertension  EKG   3. Pre-operative cardiovascular examination     4. Brachial artery aneurysm (HCC)  US-EXTREMITY ARTERY UPPER UNILAT W/WBI (COMBO)   5. Cigarette smoker     6. Coronary artery disease involving native coronary artery of native heart without angina pectoris     7. Encounter for long-term (current) use of high-risk medication         Medical Decision Making:  Today's Assessment / Status / Plan:     Brachial artery aneurysm: Appreciated on exam.  She will be referred for an arterial duplex for further evaluation.    Preoperative risk stratification: Patient is scheduled to undergo a low risk surgery.  Please proceed with planned procedure.  Patient is able to perform well over 4 METs.  No further workup recommended at this time.    Coronary artery disease: No anginal symptoms.  Continue aspirin and  metoprolol.    Hyperlipidemia: LDL at goal in October 2018.  Patient is intolerant to statins.  Continue dietary modification.    Tobacco use: Patient continues to smoke on a daily basis.  I have again discussed the benefits of smoking cessation however patient is not interested in quitting.  I spent 4 minutes discussing smoking cessation.    Return to clinic in 6 months or earlier if needed.    Thank you for allowing me to participate in the care of this patient. Please do not hesitate to contact me with any questions.    Brandi Raymundo MD  Cardiologist  [facility]      PLEASE NOTE: This dictation was created using voice recognition software.               Nikole Collier P.A.-C.  [address]  [facility] [address]  VIA In Basket

## 2019-01-31 NOTE — PROGRESS NOTES
"Chief Complaint   Patient presents with   • HTN (Controlled)   • Preoperative CV Eval       Subjective:   Brittney Bruno is a 85 y.o. female who presents today to follow-up on coronary disease.    Pertinent history:  Coronary artery disease status post PCI to the RCA 2004  Hypertension  Hyperlipidemia, intolerant to statins  Tobacco use    Patient has been doing well.  Denies any anginal symptoms.  Unfortunately she continues to smoke on a daily basis.  States that she has been smoking since the age of 18 and does not have any intention of quitting.    She is having numbness in her left fourth and fifth digit and is scheduled to undergo ulnar nerve surgery and is requesting preoperative for stratification today.  She has been monitoring her diet regularly for hyperlipidemia.    Her main concern today is a \"aneurysm\" that she has in her right elbow.  She has never had any testing performed for this.    Past Medical History:   Diagnosis Date   • Anxiety 11/7/2012   • Arthritis     degenerative osteoarthritis and osteoporosis   • CATARACT 2001    bilateral IOL   • Cervical radiculopathy 12/5/2018   • Chronic back pain     neck and low back pain, now resolved    • Chronic UTI 11/7/2012   • Compression fracture of lumbar vertebra (HCC)    • Cubital tunnel syndrome on left 12/5/2018   • DDD (degenerative disc disease), lumbar    • Dental disorder     dentures   • Drug allergy, multiple 5/13/2017   • Heart valve disease     \"leaky valves\"   • Hepatitis C    • Hypertension    • Insomnia 4/18/2014   • Late onset Alzheimer's disease without behavioral disturbance 11/15/2017   • Myocardial infarct (HCC) 2002   • Osteoarthritis 2/6/2018   • Osteoporosis 9/21/2016   • Other hyperlipidemia 9/28/2018   • Renal disorder 2006    stone   • Sciatica 11/7/2012   • Vitamin D insufficiency 8/18/2017     Past Surgical History:   Procedure Laterality Date   • CERVICAL DISK AND FUSION ANTERIOR  9/15/2010    Performed by TERESA MALDONADO " at [facility]   • BUNIONECTOMY  1989    left   • TUBAL REANASTOMOSIS  1968    unsuccessful   • TUBAL COAGULATION LAPAROSCOPIC BILATERAL  1963   • TONSILLECTOMY  1955   • COLON RESECTION     • OTHER      colon tumors removed   • OTHER ABDOMINAL SURGERY  1999, 1998    hernia   • OTHER ORTHOPEDIC SURGERY       Family History   Problem Relation Age of Onset   • Stroke Mother    • Cancer Sister      Social History     Social History   • Marital status: Single     Spouse name: N/A   • Number of children: N/A   • Years of education: N/A     Occupational History   • Not on file.     Social History Main Topics   • Smoking status: Current Every Day Smoker     Packs/day: 0.25     Types: Cigarettes   • Smokeless tobacco: Never Used   • Alcohol use No   • Drug use: No   • Sexual activity: Not on file     Other Topics Concern   • Not on file     Social History Narrative   • No narrative on file     Allergies   Allergen Reactions   • Amitriptyline Rash, Itching and Vomiting   • Darvocet [Propoxyphene N-Apap] Rash, Itching and Vomiting   • Demerol Rash, Itching and Vomiting   • Fentanyl Itching   • Morphine Rash, Itching and Vomiting   • Penicillin G Swelling   • Percocet [Oxycodone-Acetaminophen] Itching and Vomiting   • Sulfamethoxazole W-Trimethoprim Anaphylaxis   • Ultracet [Tramadol-Acetaminophen] Itching and Vomiting   • Ultram [Tramadol Hcl] Rash, Itching and Vomiting   • Vicodin [Hydrocodone-Acetaminophen] Rash, Itching and Vomiting   • Fosamax      Jaw problems   • Codeine Itching   • Dilaudid [Hydromorphone Hcl] Itching   • Imodium [Loperamide] Diarrhea     Upset stomach and diarrhea   • Milk Products Food Allergy    • Motrin [Ibuprofen]    • Sulfa Drugs      Outpatient Encounter Prescriptions as of 1/31/2019   Medication Sig Dispense Refill   • traZODone (DESYREL) 150 MG Tab TAKE 1 TABLET BY MOUTH AT BEDTIME 90 Tab 1   • rosuvastatin (CRESTOR) 5 MG Tab Take 1 Tab by mouth every evening. 90 Tab 1   • aspirin  "81 MG tablet Take 1 Tab by mouth every day. 90 Tab 1   • sertraline (ZOLOFT) 50 MG Tab TAKE 1 TABLET BY MOUTH EVERY DAY 90 Tab 1   • Multiple Vitamins-Minerals (OCUVITE-LUTEIN) Tab Take 1 tablet by mouth every day.     • Melatonin 1 MG Cap Take  by mouth.     • calcitonin, salmon, (MIACALCIN) 200 UNIT/ACT Solution SPRAY 1 SPRAY IN ALTERNATING NOSTRILS EVERY OTHER DAY 11.1 mL 2   • gabapentin (NEURONTIN) 100 MG Cap TAKE 1 CAPLET PO BID FOR NERVE PAIN  0   • Lido-Capsaicin-Men-Methyl Sal (MEDI-PATCH-LIDOCAINE EX) by Apply externally route.     • metoprolol SR (TOPROL XL) 25 MG TABLET SR 24 HR Take 1 Tab by mouth every day. 30 Tab 5   • VOLTAREN 1 % Gel Apply 2 to 4 grams to affected area 4 times daily 200 g 5   • [DISCONTINUED] ASPIRIN LOW DOSE 81 MG EC tablet TK 1 T PO  QD  1     No facility-administered encounter medications on file as of 1/31/2019.      Review of Systems   Constitutional: Negative for malaise/fatigue.   Respiratory: Negative for shortness of breath.    Cardiovascular: Negative for chest pain, palpitations, orthopnea, leg swelling and PND.   Gastrointestinal: Negative for abdominal pain.   Musculoskeletal: Negative for falls.   Neurological: Negative for dizziness and loss of consciousness.   Psychiatric/Behavioral: Negative for depression.   All other systems reviewed and are negative.       Objective:   /70 (BP Location: Right arm, Patient Position: Sitting)   Pulse 84   Ht 1.626 m (5' 4\")   Wt 55.8 kg (123 lb)   LMP  (LMP Unknown)   SpO2 91%   BMI 21.11 kg/m²     Physical Exam   Constitutional: She is oriented to person, place, and time. She appears well-developed and well-nourished. No distress.   HENT:   Head: Normocephalic and atraumatic.   Eyes: Conjunctivae are normal. No scleral icterus.   Neck: Normal range of motion. Neck supple.   Cardiovascular: Normal rate, regular rhythm and normal heart sounds.  Exam reveals no gallop and no friction rub.    No murmur heard.  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  Arterial pulsation is appreciated.   Pulmonary/Chest: Effort normal and breath sounds normal. No respiratory distress. She has no wheezes. She has no rales.   Abdominal: Soft. She exhibits no distension. There is no tenderness.   Musculoskeletal: She exhibits no edema.   Neurological: She is alert and oriented to person, place, and time.   Skin: Skin is warm and dry. She is not diaphoretic.   Psychiatric: She has a normal mood and affect. Her behavior is normal.   Nursing note and vitals reviewed.    Labs performed in January 2019 were reviewed and showed normal creatinine.    EKG performed today was personally reviewed and per my interpretation shows sinus rhythm at 81 bpm.  Nonspecific ST changes.    Assessment:     1. Preop cardiovascular exam  EKG   2. Essential hypertension  EKG   3. Pre-operative cardiovascular examination     4. Brachial artery aneurysm (HCC)  US-EXTREMITY ARTERY UPPER UNILAT W/WBI (COMBO)   5. Cigarette smoker     6. Coronary artery disease involving native coronary artery of native heart without angina pectoris     7. Encounter for long-term (current) use of high-risk medication         Medical Decision Making:  Today's Assessment / Status / Plan:     Brachial artery aneurysm: Appreciated on exam.  She will be referred for an arterial duplex for further evaluation.    Preoperative risk stratification: Patient is scheduled to undergo a low risk surgery.  Please proceed with planned procedure.  Patient is able to perform well over 4 METs.  No further workup recommended at this time.    Coronary artery disease: No anginal symptoms.  Continue aspirin and metoprolol.    Hyperlipidemia: LDL at goal in October 2018.  Patient is intolerant to statins.  Continue dietary modification.    Tobacco use: Patient continues to smoke on a daily basis.  I have again discussed the benefits of smoking cessation however patient is not interested in quitting.  I  spent 4 minutes discussing smoking cessation.    Return to clinic in 6 months or earlier if needed.    Thank you for allowing me to participate in the care of this patient. Please do not hesitate to contact me with any questions.    Brandi Raymundo MD  Cardiologist  [facility]      PLEASE NOTE: This dictation was created using voice recognition software.

## 2019-02-06 ENCOUNTER — TELEPHONE (OUTPATIENT)
Dept: CARDIOLOGY | Facility: MEDICAL CENTER | Age: 84
End: 2019-02-06

## 2019-02-06 NOTE — TELEPHONE ENCOUNTER
Reading Physician Reading Date Result Priority   Dwight Martin M.D. 2/5/2019    Narrative         HISTORY/REASON FOR EXAM: .  Please evaluate for brachial artery aneurysm.  Thank you      TECHNIQUE/EXAM DESCRIPTION: Arterial duplex of the right upper extremity is performed.,  2/5/2019 1:07 PM.    COMPARISON: None.    FINDINGS:  The right brachial artery demonstrates normal contour and color flow.    There is no evidence of aneurysm as clinically questioned.  The artery does course somewhat unusually superficially within the soft tissues of the upper arm. This anatomical variant may account for the palpable abnormality noted by the primary care provider    No area of stenosis is noted.    No cystic or solid mass is incidentally identified.     =================================  Called pt with results. Pt states understanding.

## 2019-02-13 ENCOUNTER — TELEPHONE (OUTPATIENT)
Dept: CARDIOLOGY | Facility: MEDICAL CENTER | Age: 84
End: 2019-02-13

## 2019-02-13 DIAGNOSIS — I10 ESSENTIAL HYPERTENSION: ICD-10-CM

## 2019-02-13 RX ORDER — METOPROLOL SUCCINATE 25 MG/1
25 TABLET, EXTENDED RELEASE ORAL DAILY
Qty: 90 TAB | Refills: 3 | Status: SHIPPED | OUTPATIENT
Start: 2019-02-13 | End: 2019-02-21 | Stop reason: SDUPTHER

## 2019-02-13 NOTE — TELEPHONE ENCOUNTER
Brandi Raymundo M.D.   You 1 hour ago (2:26 PM)      Please see my last note which contains perioperative risk stratification.     =============================  Letter faxed to Dr. Bean at [phone]. Confirmation received. Pt notified an states understanding.

## 2019-02-13 NOTE — TELEPHONE ENCOUNTER
----- Message from Lainey Landon sent at 2/13/2019  1:28 PM PST -----  Regarding: clearance for surgery  Contact: [phone]  MICHOACANO/vinh    Pt calling for clearance to have surgery to move the nerve in her ulna in left arm.  Surgery to be done by Dr [facility] in [facility].  Ph# for Dr Bean is: [phone] (Altagracia, surgical coordinator).  Please call pt at .

## 2019-02-20 PROBLEM — S22.050A CLOSED WEDGE COMPRESSION FRACTURE OF T6 VERTEBRA (HCC): Status: ACTIVE | Noted: 2019-02-20

## 2019-02-20 PROBLEM — M51.36 DDD (DEGENERATIVE DISC DISEASE), LUMBAR: Status: ACTIVE | Noted: 2019-02-20

## 2019-03-28 PROBLEM — Z86.19 HISTORY OF HEPATITIS C: Status: ACTIVE | Noted: 2019-03-28

## 2019-05-16 PROBLEM — R31.21 ASYMPTOMATIC MICROSCOPIC HEMATURIA: Status: ACTIVE | Noted: 2019-05-16

## 2019-05-30 PROBLEM — N18.30 STAGE 3 CHRONIC KIDNEY DISEASE: Status: ACTIVE | Noted: 2019-05-30

## 2019-07-02 ENCOUNTER — OFFICE VISIT (OUTPATIENT)
Dept: CARDIOLOGY | Facility: CLINIC | Age: 84
End: 2019-07-02
Payer: MEDICARE

## 2019-07-02 VITALS
BODY MASS INDEX: 20.66 KG/M2 | WEIGHT: 121 LBS | DIASTOLIC BLOOD PRESSURE: 68 MMHG | SYSTOLIC BLOOD PRESSURE: 140 MMHG | HEIGHT: 64 IN | HEART RATE: 84 BPM | OXYGEN SATURATION: 94 %

## 2019-07-02 DIAGNOSIS — F17.210 CIGARETTE SMOKER: ICD-10-CM

## 2019-07-02 DIAGNOSIS — I70.0 CALCIFICATION OF AORTA (HCC): ICD-10-CM

## 2019-07-02 DIAGNOSIS — Z79.899 ENCOUNTER FOR LONG-TERM (CURRENT) USE OF HIGH-RISK MEDICATION: ICD-10-CM

## 2019-07-02 DIAGNOSIS — E78.49 OTHER HYPERLIPIDEMIA: ICD-10-CM

## 2019-07-02 DIAGNOSIS — I25.83 CORONARY ARTERY DISEASE DUE TO LIPID RICH PLAQUE: ICD-10-CM

## 2019-07-02 DIAGNOSIS — I10 ESSENTIAL HYPERTENSION: ICD-10-CM

## 2019-07-02 DIAGNOSIS — I25.10 CORONARY ARTERY DISEASE DUE TO LIPID RICH PLAQUE: ICD-10-CM

## 2019-07-02 PROCEDURE — 99214 OFFICE O/P EST MOD 30 MIN: CPT | Mod: 25 | Performed by: INTERNAL MEDICINE

## 2019-07-02 PROCEDURE — 99406 BEHAV CHNG SMOKING 3-10 MIN: CPT | Performed by: INTERNAL MEDICINE

## 2019-07-02 RX ORDER — MIRTAZAPINE 30 MG/1
TABLET, FILM COATED ORAL
Refills: 2 | COMMUNITY
Start: 2019-06-28 | End: 2019-07-02

## 2019-07-02 ASSESSMENT — ENCOUNTER SYMPTOMS
DEPRESSION: 0
ORTHOPNEA: 0
PALPITATIONS: 0
SHORTNESS OF BREATH: 0
ABDOMINAL PAIN: 0
LOSS OF CONSCIOUSNESS: 0
PND: 0
FALLS: 0
DIZZINESS: 0

## 2019-07-02 NOTE — LETTER
"     [facility] [address],   [address] [place] [address]  Phone: [phone]  Fax: [phone]              Brittney Bruno  11/23/1933    Encounter Date: 7/2/2019    Brandi Raymundo M.D.          PROGRESS NOTE:  Chief Complaint   Patient presents with   • HTN (Controlled)       Subjective:   Brittney Burno is a 85-year-old female presented clinic for follow-up on coronary artery disease and hypertension.    Pertinent history:  Coronary artery disease status post PCI to the RCA 2004  Hypertension  Hyperlipidemia  Tobacco use    From a cardiac standpoint patient has been doing well overall.  Denies any anginal symptoms.  Her blood pressures have been well controlled, usually systolics 120s to 130s.  She believes her blood pressure is elevated today as she has severe wrist discomfort.  For her hyperlipidemia she is currently on Crestor which she is tolerating well.  She is really worried about her recent CT scan that showed calcification of the aorta.  She continues to smoke on a daily basis.    Past Medical History:   Diagnosis Date   • Anxiety 11/7/2012   • Arthritis     degenerative osteoarthritis and osteoporosis   • CATARACT 2001    bilateral IOL   • Cervical radiculopathy 12/5/2018   • Chronic back pain     neck and low back pain, now resolved    • Chronic UTI 11/7/2012   • Closed wedge compression fracture of T6 vertebra (HCC) 2/20/2019   • Compression fracture of lumbar vertebra (HCC)    • Cubital tunnel syndrome on left 12/5/2018   • DDD (degenerative disc disease), lumbar    • Dental disorder     dentures   • Drug allergy, multiple 5/13/2017   • Heart valve disease     \"leaky valves\"   • Hepatitis C    • History of hepatitis C 3/28/2019   • Hypertension    • Insomnia 4/18/2014   • Late onset Alzheimer's disease without behavioral disturbance 11/15/2017   • Myocardial infarct (HCC) 2002   • Osteoarthritis 2/6/2018   • " Osteoporosis 9/21/2016   • Other hyperlipidemia 9/28/2018   • Renal disorder 2006    stone   • Sciatica 11/7/2012   • Vitamin D insufficiency 8/18/2017     Past Surgical History:   Procedure Laterality Date   • CERVICAL DISK AND FUSION ANTERIOR  9/15/2010    Performed by TERESA MALDONADO at SURGERY [facility]   • BUNIONECTOMY  1989    left   • TUBAL REANASTOMOSIS  1968    unsuccessful   • TUBAL COAGULATION LAPAROSCOPIC BILATERAL  1963   • TONSILLECTOMY  1955   • COLON RESECTION     • OTHER      colon tumors removed   • OTHER ABDOMINAL SURGERY  1999, 1998    hernia   • OTHER ORTHOPEDIC SURGERY       Family History   Problem Relation Age of Onset   • Stroke Mother    • Cancer Sister      Social History     Social History   • Marital status: Single     Spouse name: N/A   • Number of children: N/A   • Years of education: N/A     Occupational History   • Not on file.     Social History Main Topics   • Smoking status: Current Every Day Smoker     Packs/day: 0.25     Years: 70.00     Types: Cigarettes   • Smokeless tobacco: Never Used   • Alcohol use No   • Drug use: No   • Sexual activity: Not Currently     Other Topics Concern   • Not on file     Social History Narrative   • No narrative on file     Allergies   Allergen Reactions   • Amitriptyline Rash, Itching and Vomiting   • Darvocet [Propoxyphene N-Apap] Rash, Itching and Vomiting   • Demerol Rash, Itching and Vomiting   • Fentanyl Itching   • Morphine Rash, Itching and Vomiting   • Penicillin G Swelling   • Percocet [Oxycodone-Acetaminophen] Itching and Vomiting   • Sulfamethoxazole W-Trimethoprim Anaphylaxis   • Ultracet [Tramadol-Acetaminophen] Itching and Vomiting   • Ultram [Tramadol Hcl] Rash, Itching and Vomiting   • Vicodin [Hydrocodone-Acetaminophen] Rash, Itching and Vomiting   • Fosamax      Jaw problems   • Codeine Itching   • Imodium [Loperamide] Diarrhea     Upset stomach and diarrhea   • Metoprolol Diarrhea     Diarrhea when taking this medication      • Milk Products Food Allergy    • Motrin [Ibuprofen]    • Sulfa Drugs      Outpatient Encounter Prescriptions as of 7/2/2019   Medication Sig Dispense Refill   • ASPIRIN 81 PO Take  by mouth.     • Melatonin 10 MG Tab Take  by mouth.     • traZODone (DESYREL) 150 MG Tab Take 150 mg by mouth every evening.     • Telmisartan-amLODIPine 40-10 MG Tab TAKE 1 TABLET BY MOUTH EVERY DAY 90 Tab 1   • gabapentin (NEURONTIN) 100 MG Cap Take 1 Cap by mouth every bedtime. (Patient taking differently: Take 100 mg by mouth 2 Times a Day.) 30 Cap 0   • lidocaine (LIDODERM) 5 % Patch Apply 1 Patch to skin as directed every 24 hours. As needed for bone pain     • Hypromellose (ARTIFICIAL TEARS OP) by Ophthalmic route. As needed     • VOLTAREN 1 % Gel Apply 1 g to skin as directed 4 times a day. 1 Tube 3   • rosuvastatin (CRESTOR) 5 MG Tab Take 1 Tab by mouth every evening. 90 Tab 1   • Multiple Vitamins-Minerals (OCUVITE-LUTEIN) Tab Take 1 tablet by mouth every day.     • [DISCONTINUED] mirtazapine (REMERON) 30 MG Tab tablet TK 1 T PO QHS  2   • loperamide (IMODIUM) 2 MG Cap Take 1 Cap by mouth 4 times a day as needed for Diarrhea. 30 Cap 1   • [DISCONTINUED] atenolol (TENORMIN) 50 MG Tab Take 1 Tab by mouth every day. 30 Tab 0   • [DISCONTINUED] HYDROmorphone (DILAUDID) 2 MG Tab Take 2-4 mg by mouth. As needed for severe pain     • [DISCONTINUED] DENOSUMAB SC Inject  as instructed. Every 6 months       No facility-administered encounter medications on file as of 7/2/2019.      Review of Systems   Constitutional: Negative for malaise/fatigue.   Respiratory: Negative for shortness of breath.    Cardiovascular: Negative for chest pain, palpitations, orthopnea, leg swelling and PND.   Gastrointestinal: Negative for abdominal pain.   Musculoskeletal: Negative for falls.   Neurological: Negative for dizziness and loss of consciousness.   Psychiatric/Behavioral: Negative for depression.   All other systems reviewed and are  "negative.       Objective:   /68 (BP Location: Right arm, Patient Position: Sitting)   Pulse 84   Ht 1.626 m (5' 4\")   Wt 54.9 kg (121 lb)   LMP  (LMP Unknown)   SpO2 94%   BMI 20.77 kg/m²      Physical Exam   Constitutional: She is oriented to person, place, and time. She appears well-developed and well-nourished. No distress.   HENT:   Head: Normocephalic and atraumatic.   Eyes: Conjunctivae are normal. No scleral icterus.   Neck: Normal range of motion. Neck supple.   Cardiovascular: Normal rate, regular rhythm and normal heart sounds.  Exam reveals no gallop and no friction rub.    No murmur heard.  Pulmonary/Chest: Effort normal and breath sounds normal. No respiratory distress. She has no wheezes. She has no rales.   Abdominal: Soft. She exhibits no distension. There is no tenderness.   Musculoskeletal: She exhibits no edema.   Neurological: She is alert and oriented to person, place, and time.   Skin: Skin is warm and dry. She is not diaphoretic.   Psychiatric: She has a normal mood and affect. Her behavior is normal.   Nursing note and vitals reviewed.      Labs performed in June 2019 were reviewed and showed normal creatinine.    Right upper extremity arterial duplex performed in February 2019.  Report was reviewed and did not show any brachial artery pathology.  No evidence of aneurysm.    CT of the thoracic aorta performed was reviewed and showed aortic calcification.    Assessment:     1. Coronary artery disease due to lipid rich plaque  Lipid Profile   2. Other hyperlipidemia     3. Cigarette smoker     4. Essential hypertension     5. Calcification of aorta (HCC)     6. Encounter for long-term (current) use of high-risk medication         Medical Decision Making:  Today's Assessment / Status / Plan:      Coronary artery disease:  Hypertension:  Hyperlipidemia:  No anginal symptoms.  Lipid panel ordered today.  Blood pressure is not at goal likely secondary to wrist discomfort as noted " above.  Continue aspirin and Crestor at current dose.  Continue telmisartan and amlodipine at current dose.  If blood pressure at home continues to be elevated, she will let us know.  Her last Chem-7 had showed mild increase in creatinine, however most recent labs show normalization of creatinine.    Tobacco use: Patient continues to smoke on a daily basis.  I have discussed the benefits of smoking cessation.  Patient states that she has been smoking all of her life and is not interested in quitting at this time.  I spent 4 minutes discussing smoking cessation.    Calcification of the aorta: Patient is extremely concerned about this finding.  I have explained to her that it is not unexpected given her significant smoking history.  Since she is asymptomatic, I would not recommend any further work-up.  Ascending aorta normal in size.    Return to clinic in 1 year or earlier if needed.    Thank you for allowing me to participate in the care of this patient. Please do not hesitate to contact me with any questions.    Brandi Raymundo MD  Cardiologist  [facility]      PLEASE NOTE: This dictation was created using voice recognition software.               Jacobo Skelton M.D.  [address]  [address]  [place] [address]  VIA In Basket

## 2019-07-02 NOTE — PROGRESS NOTES
"Chief Complaint   Patient presents with   • HTN (Controlled)       Subjective:   Brittney Bruno is a 85-year-old female presented clinic for follow-up on coronary artery disease and hypertension.    Pertinent history:  Coronary artery disease status post PCI to the RCA 2004  Hypertension  Hyperlipidemia  Tobacco use    From a cardiac standpoint patient has been doing well overall.  Denies any anginal symptoms.  Her blood pressures have been well controlled, usually systolics 120s to 130s.  She believes her blood pressure is elevated today as she has severe wrist discomfort.  For her hyperlipidemia she is currently on Crestor which she is tolerating well.  She is really worried about her recent CT scan that showed calcification of the aorta.  She continues to smoke on a daily basis.    Past Medical History:   Diagnosis Date   • Anxiety 11/7/2012   • Arthritis     degenerative osteoarthritis and osteoporosis   • CATARACT 2001    bilateral IOL   • Cervical radiculopathy 12/5/2018   • Chronic back pain     neck and low back pain, now resolved    • Chronic UTI 11/7/2012   • Closed wedge compression fracture of T6 vertebra (HCC) 2/20/2019   • Compression fracture of lumbar vertebra (HCC)    • Cubital tunnel syndrome on left 12/5/2018   • DDD (degenerative disc disease), lumbar    • Dental disorder     dentures   • Drug allergy, multiple 5/13/2017   • Heart valve disease     \"leaky valves\"   • Hepatitis C    • History of hepatitis C 3/28/2019   • Hypertension    • Insomnia 4/18/2014   • Late onset Alzheimer's disease without behavioral disturbance 11/15/2017   • Myocardial infarct (HCC) 2002   • Osteoarthritis 2/6/2018   • Osteoporosis 9/21/2016   • Other hyperlipidemia 9/28/2018   • Renal disorder 2006    stone   • Sciatica 11/7/2012   • Vitamin D insufficiency 8/18/2017     Past Surgical History:   Procedure Laterality Date   • CERVICAL DISK AND FUSION ANTERIOR  9/15/2010    Performed by TERESA MALDONADO at SURGERY " [facility]   • BUNIONECTOMY  1989    left   • TUBAL REANASTOMOSIS  1968    unsuccessful   • TUBAL COAGULATION LAPAROSCOPIC BILATERAL  1963   • TONSILLECTOMY  1955   • COLON RESECTION     • OTHER      colon tumors removed   • OTHER ABDOMINAL SURGERY  1999, 1998    hernia   • OTHER ORTHOPEDIC SURGERY       Family History   Problem Relation Age of Onset   • Stroke Mother    • Cancer Sister      Social History     Social History   • Marital status: Single     Spouse name: N/A   • Number of children: N/A   • Years of education: N/A     Occupational History   • Not on file.     Social History Main Topics   • Smoking status: Current Every Day Smoker     Packs/day: 0.25     Years: 70.00     Types: Cigarettes   • Smokeless tobacco: Never Used   • Alcohol use No   • Drug use: No   • Sexual activity: Not Currently     Other Topics Concern   • Not on file     Social History Narrative   • No narrative on file     Allergies   Allergen Reactions   • Amitriptyline Rash, Itching and Vomiting   • Darvocet [Propoxyphene N-Apap] Rash, Itching and Vomiting   • Demerol Rash, Itching and Vomiting   • Fentanyl Itching   • Morphine Rash, Itching and Vomiting   • Penicillin G Swelling   • Percocet [Oxycodone-Acetaminophen] Itching and Vomiting   • Sulfamethoxazole W-Trimethoprim Anaphylaxis   • Ultracet [Tramadol-Acetaminophen] Itching and Vomiting   • Ultram [Tramadol Hcl] Rash, Itching and Vomiting   • Vicodin [Hydrocodone-Acetaminophen] Rash, Itching and Vomiting   • Fosamax      Jaw problems   • Codeine Itching   • Imodium [Loperamide] Diarrhea     Upset stomach and diarrhea   • Metoprolol Diarrhea     Diarrhea when taking this medication    • Milk Products Food Allergy    • Motrin [Ibuprofen]    • Sulfa Drugs      Outpatient Encounter Prescriptions as of 7/2/2019   Medication Sig Dispense Refill   • ASPIRIN 81 PO Take  by mouth.     • Melatonin 10 MG Tab Take  by mouth.     • traZODone (DESYREL) 150 MG Tab Take 150 mg by mouth  "every evening.     • Telmisartan-amLODIPine 40-10 MG Tab TAKE 1 TABLET BY MOUTH EVERY DAY 90 Tab 1   • gabapentin (NEURONTIN) 100 MG Cap Take 1 Cap by mouth every bedtime. (Patient taking differently: Take 100 mg by mouth 2 Times a Day.) 30 Cap 0   • lidocaine (LIDODERM) 5 % Patch Apply 1 Patch to skin as directed every 24 hours. As needed for bone pain     • Hypromellose (ARTIFICIAL TEARS OP) by Ophthalmic route. As needed     • VOLTAREN 1 % Gel Apply 1 g to skin as directed 4 times a day. 1 Tube 3   • rosuvastatin (CRESTOR) 5 MG Tab Take 1 Tab by mouth every evening. 90 Tab 1   • Multiple Vitamins-Minerals (OCUVITE-LUTEIN) Tab Take 1 tablet by mouth every day.     • [DISCONTINUED] mirtazapine (REMERON) 30 MG Tab tablet TK 1 T PO QHS  2   • loperamide (IMODIUM) 2 MG Cap Take 1 Cap by mouth 4 times a day as needed for Diarrhea. 30 Cap 1   • [DISCONTINUED] atenolol (TENORMIN) 50 MG Tab Take 1 Tab by mouth every day. 30 Tab 0   • [DISCONTINUED] HYDROmorphone (DILAUDID) 2 MG Tab Take 2-4 mg by mouth. As needed for severe pain     • [DISCONTINUED] DENOSUMAB SC Inject  as instructed. Every 6 months       No facility-administered encounter medications on file as of 7/2/2019.      Review of Systems   Constitutional: Negative for malaise/fatigue.   Respiratory: Negative for shortness of breath.    Cardiovascular: Negative for chest pain, palpitations, orthopnea, leg swelling and PND.   Gastrointestinal: Negative for abdominal pain.   Musculoskeletal: Negative for falls.   Neurological: Negative for dizziness and loss of consciousness.   Psychiatric/Behavioral: Negative for depression.   All other systems reviewed and are negative.       Objective:   /68 (BP Location: Right arm, Patient Position: Sitting)   Pulse 84   Ht 1.626 m (5' 4\")   Wt 54.9 kg (121 lb)   LMP  (LMP Unknown)   SpO2 94%   BMI 20.77 kg/m²     Physical Exam   Constitutional: She is oriented to person, place, and time. She appears well-developed " and well-nourished. No distress.   HENT:   Head: Normocephalic and atraumatic.   Eyes: Conjunctivae are normal. No scleral icterus.   Neck: Normal range of motion. Neck supple.   Cardiovascular: Normal rate, regular rhythm and normal heart sounds.  Exam reveals no gallop and no friction rub.    No murmur heard.  Pulmonary/Chest: Effort normal and breath sounds normal. No respiratory distress. She has no wheezes. She has no rales.   Abdominal: Soft. She exhibits no distension. There is no tenderness.   Musculoskeletal: She exhibits no edema.   Neurological: She is alert and oriented to person, place, and time.   Skin: Skin is warm and dry. She is not diaphoretic.   Psychiatric: She has a normal mood and affect. Her behavior is normal.   Nursing note and vitals reviewed.      Labs performed in June 2019 were reviewed and showed normal creatinine.    Right upper extremity arterial duplex performed in February 2019.  Report was reviewed and did not show any brachial artery pathology.  No evidence of aneurysm.    CT of the thoracic aorta performed was reviewed and showed aortic calcification.    Assessment:     1. Coronary artery disease due to lipid rich plaque  Lipid Profile   2. Other hyperlipidemia     3. Cigarette smoker     4. Essential hypertension     5. Calcification of aorta (HCC)     6. Encounter for long-term (current) use of high-risk medication         Medical Decision Making:  Today's Assessment / Status / Plan:      Coronary artery disease:  Hypertension:  Hyperlipidemia:  No anginal symptoms.  Lipid panel ordered today.  Blood pressure is not at goal likely secondary to wrist discomfort as noted above.  Continue aspirin and Crestor at current dose.  Continue telmisartan and amlodipine at current dose.  If blood pressure at home continues to be elevated, she will let us know.  Her last Chem-7 had showed mild increase in creatinine, however most recent labs show normalization of creatinine.    Tobacco use:  Patient continues to smoke on a daily basis.  I have discussed the benefits of smoking cessation.  Patient states that she has been smoking all of her life and is not interested in quitting at this time.  I spent 4 minutes discussing smoking cessation.    Calcification of the aorta: Patient is extremely concerned about this finding.  I have explained to her that it is not unexpected given her significant smoking history.  Since she is asymptomatic, I would not recommend any further work-up.  Ascending aorta normal in size.    Return to clinic in 1 year or earlier if needed.    Thank you for allowing me to participate in the care of this patient. Please do not hesitate to contact me with any questions.    Brandi Raymundo MD  Cardiologist  [facility]      PLEASE NOTE: This dictation was created using voice recognition software.

## 2019-07-29 PROBLEM — G56.22 ULNAR NEUROPATHY OF LEFT UPPER EXTREMITY: Status: ACTIVE | Noted: 2019-07-29

## 2019-08-12 PROBLEM — I72.1 ANEURYSM OF ARTERY OF UPPER EXTREMITY (HCC): Status: ACTIVE | Noted: 2019-08-12

## 2019-08-12 PROBLEM — Z86.73 PERSONAL HISTORY OF TRANSIENT ISCHEMIC ATTACK (TIA), AND CEREBRAL INFARCTION WITHOUT RESIDUAL DEFICITS: Status: ACTIVE | Noted: 2019-08-12

## 2019-08-12 PROBLEM — I65.23 OCCLUSION AND STENOSIS OF BILATERAL CAROTID ARTERIES: Status: ACTIVE | Noted: 2019-08-12

## 2019-08-12 PROBLEM — M79.601 PAIN IN RIGHT ARM: Status: ACTIVE | Noted: 2019-08-12

## 2019-08-12 PROBLEM — K52.9 CHRONIC DIARRHEA: Status: RESOLVED | Noted: 2018-09-11 | Resolved: 2019-08-12

## 2019-09-17 ENCOUNTER — OFFICE VISIT (OUTPATIENT)
Dept: CARDIOLOGY | Facility: CLINIC | Age: 84
End: 2019-09-17
Payer: MEDICARE

## 2019-09-17 VITALS
OXYGEN SATURATION: 94 % | HEART RATE: 90 BPM | SYSTOLIC BLOOD PRESSURE: 130 MMHG | DIASTOLIC BLOOD PRESSURE: 70 MMHG | BODY MASS INDEX: 23.05 KG/M2 | WEIGHT: 135 LBS | HEIGHT: 64 IN

## 2019-09-17 DIAGNOSIS — Z79.899 ENCOUNTER FOR LONG-TERM (CURRENT) USE OF HIGH-RISK MEDICATION: ICD-10-CM

## 2019-09-17 DIAGNOSIS — F17.210 CIGARETTE SMOKER: ICD-10-CM

## 2019-09-17 DIAGNOSIS — R60.0 LOWER EXTREMITY EDEMA: ICD-10-CM

## 2019-09-17 DIAGNOSIS — I25.10 CORONARY ARTERY DISEASE DUE TO LIPID RICH PLAQUE: ICD-10-CM

## 2019-09-17 DIAGNOSIS — E78.49 OTHER HYPERLIPIDEMIA: ICD-10-CM

## 2019-09-17 DIAGNOSIS — I25.83 CORONARY ARTERY DISEASE DUE TO LIPID RICH PLAQUE: ICD-10-CM

## 2019-09-17 DIAGNOSIS — I10 ESSENTIAL HYPERTENSION: ICD-10-CM

## 2019-09-17 PROCEDURE — 99406 BEHAV CHNG SMOKING 3-10 MIN: CPT | Performed by: INTERNAL MEDICINE

## 2019-09-17 PROCEDURE — 99215 OFFICE O/P EST HI 40 MIN: CPT | Mod: 25 | Performed by: INTERNAL MEDICINE

## 2019-09-17 RX ORDER — ROSUVASTATIN CALCIUM 5 MG/1
TABLET, COATED ORAL
Qty: 90 TAB | Refills: 3 | Status: SHIPPED | OUTPATIENT
Start: 2019-09-17 | End: 2020-01-14 | Stop reason: SDUPTHER

## 2019-09-17 RX ORDER — HYDROCHLOROTHIAZIDE 12.5 MG/1
12.5 TABLET ORAL DAILY
Qty: 30 TAB | Refills: 11 | Status: SHIPPED | OUTPATIENT
Start: 2019-09-17 | End: 2019-09-26

## 2019-09-17 RX ORDER — TELMISARTAN AND AMLODIPINE 10; 40 MG/1; MG/1
TABLET ORAL
Refills: 1 | COMMUNITY
Start: 2019-09-12 | End: 2019-09-17

## 2019-09-17 ASSESSMENT — ENCOUNTER SYMPTOMS
SHORTNESS OF BREATH: 0
PND: 0
ABDOMINAL PAIN: 0
ORTHOPNEA: 0
DEPRESSION: 0
FALLS: 0
PALPITATIONS: 0
LOSS OF CONSCIOUSNESS: 0
DIZZINESS: 0

## 2019-09-17 NOTE — PROGRESS NOTES
"Chief Complaint   Patient presents with   • Coronary Artery Disease   • HTN (Controlled)       Subjective:   Brittney Bruno is a 85-year-old female presenting to clinic for follow-up on hypertension.    Pertinent history:  Coronary artery disease status post PCI to the RCA 2004  Hypertension  Hyperlipidemia  Tobacco use    Patient's main concern today is that she does not like taking the telmisartan.  She believes that it causes lower extremity edema.  She used to be on telmisartan amlodipine combination pill which caused significant lower extremity edema.  Since her amlodipine portion was discontinued, her edema improved but she continues to have pedal edema which is uncomfortable to her.  She also reports feeling discomfort in her toes due to the swelling.  She reports avoiding salt.    For hyperlipidemia she is currently on Crestor which she is tolerating well.  She has been smoking on a daily basis.    From a coronary artery disease standpoint, she denies any anginal symptoms.    Past Medical History:   Diagnosis Date   • Anxiety 11/7/2012   • Arthritis     degenerative osteoarthritis and osteoporosis   • CATARACT 2001    bilateral IOL   • Cervical radiculopathy 12/5/2018   • Chronic back pain     neck and low back pain, now resolved    • Chronic UTI 11/7/2012   • Closed wedge compression fracture of T6 vertebra (HCC) 2/20/2019   • Compression fracture of lumbar vertebra (HCC)    • Cubital tunnel syndrome on left 12/5/2018   • DDD (degenerative disc disease), lumbar    • Dental disorder     dentures   • Drug allergy, multiple 5/13/2017   • Heart valve disease     \"leaky valves\"   • Hepatitis C    • History of hepatitis C 3/28/2019   • Hypertension    • Insomnia 4/18/2014   • Late onset Alzheimer's disease without behavioral disturbance 11/15/2017   • Myocardial infarct (HCC) 2002   • Osteoarthritis 2/6/2018   • Osteoporosis 9/21/2016   • Other hyperlipidemia 9/28/2018   • Renal disorder 2006    stone   • " Sciatica 11/7/2012   • Vitamin D insufficiency 8/18/2017     Past Surgical History:   Procedure Laterality Date   • CERVICAL DISK AND FUSION ANTERIOR  9/15/2010    Performed by TERESA MALDONADO at [facility]ERY [facility]   • BUNIONECTOMY  1989    left   • TUBAL REANASTOMOSIS  1968    unsuccessful   • TUBAL COAGULATION LAPAROSCOPIC BILATERAL  1963   • TONSILLECTOMY  1955   • COLON RESECTION     • OTHER      colon tumors removed   • OTHER ABDOMINAL SURGERY  1999, 1998    hernia   • OTHER ORTHOPEDIC SURGERY       Family History   Problem Relation Age of Onset   • Stroke Mother    • Cancer Sister      Social History     Socioeconomic History   • Marital status: Single     Spouse name: Not on file   • Number of children: Not on file   • Years of education: Not on file   • Highest education level: Not on file   Occupational History   • Not on file   Social Needs   • Financial resource strain: Not on file   • Food insecurity:     Worry: Not on file     Inability: Not on file   • Transportation needs:     Medical: Not on file     Non-medical: Not on file   Tobacco Use   • Smoking status: Current Every Day Smoker     Packs/day: 0.25     Years: 70.00     Pack years: 17.50     Types: Cigarettes   • Smokeless tobacco: Never Used   Substance and Sexual Activity   • Alcohol use: No   • Drug use: No   • Sexual activity: Not Currently   Lifestyle   • Physical activity:     Days per week: Not on file     Minutes per session: Not on file   • Stress: Not on file   Relationships   • Social connections:     Talks on phone: Not on file     Gets together: Not on file     Attends Congregational service: Not on file     Active member of club or organization: Not on file     Attends meetings of clubs or organizations: Not on file     Relationship status: Not on file   • Intimate partner violence:     Fear of current or ex partner: Not on file     Emotionally abused: Not on file     Physically abused: Not on file     Forced sexual activity: Not on  file   Other Topics Concern   • Not on file   Social History Narrative   • Not on file     Allergies   Allergen Reactions   • Amitriptyline Rash, Itching and Vomiting   • Darvocet [Propoxyphene N-Apap] Rash, Itching and Vomiting   • Demerol Rash, Itching and Vomiting   • Fentanyl Itching   • Morphine Rash, Itching and Vomiting   • Penicillin G Swelling   • Percocet [Oxycodone-Acetaminophen] Itching and Vomiting   • Sulfamethoxazole W-Trimethoprim Anaphylaxis   • Ultracet [Tramadol-Acetaminophen] Itching and Vomiting   • Ultram [Tramadol Hcl] Rash, Itching and Vomiting   • Vicodin [Hydrocodone-Acetaminophen] Rash, Itching and Vomiting   • Fosamax      Jaw problems   • Codeine Itching   • Imodium [Loperamide] Diarrhea     Upset stomach and diarrhea   • Metoprolol Diarrhea     Diarrhea when taking this medication    • Milk Products Food Allergy    • Motrin [Ibuprofen]    • Sulfa Drugs      Outpatient Encounter Medications as of 9/17/2019   Medication Sig Dispense Refill   • hydroCHLOROthiazide (HYDRODIURIL) 12.5 MG tablet Take 1 Tab by mouth every day. 30 Tab 11   • rosuvastatin (CRESTOR) 5 MG Tab TAKE 1 TABLET BY MOUTH EVERY EVENING 90 Tab 3   • gabapentin (NEURONTIN) 100 MG Cap Take 1 Cap by mouth 3 times a day for 60 days. 90 Cap 1   • traZODone (DESYREL) 150 MG Tab TAKE 1 TABLET BY MOUTH AT BEDTIME (Patient taking differently: 150 mg. TAKE 1 TABLET BY MOUTH AT BEDTIME) 90 Tab 1   • ASPIRIN 81 PO Take  by mouth.     • Melatonin 10 MG Tab Take  by mouth.     • lidocaine (LIDODERM) 5 % Patch Apply 1 Patch to skin as directed every 24 hours. As needed for bone pain     • Hypromellose (ARTIFICIAL TEARS OP) by Ophthalmic route. As needed     • VOLTAREN 1 % Gel Apply 1 g to skin as directed 4 times a day. 1 Tube 3   • [DISCONTINUED] Telmisartan-amLODIPine 40-10 MG Tab TK 1 T PO QD  1   • [DISCONTINUED] telmisartan (MICARDIS) 40 MG Tab TAKE 1 TABLET BY MOUTH EVERY DAY 30 Tab 0   • [DISCONTINUED] rosuvastatin (CRESTOR) 5 MG  "Tab TAKE 1 TABLET BY MOUTH EVERY EVENING (Patient not taking: Reported on 9/17/2019) 90 Tab 1     No facility-administered encounter medications on file as of 9/17/2019.      Review of Systems   Constitutional: Negative for malaise/fatigue.   Respiratory: Negative for shortness of breath.    Cardiovascular: Positive for leg swelling. Negative for chest pain, palpitations, orthopnea and PND.   Gastrointestinal: Negative for abdominal pain.   Musculoskeletal: Negative for falls.   Neurological: Negative for dizziness and loss of consciousness.   Psychiatric/Behavioral: Negative for depression.   All other systems reviewed and are negative.       Objective:   /70 (BP Location: Right arm, Patient Position: Sitting)   Pulse 90   Ht 1.626 m (5' 4\")   Wt 61.2 kg (135 lb)   LMP  (LMP Unknown)   SpO2 94%   BMI 23.17 kg/m²     Physical Exam   Constitutional: She is oriented to person, place, and time. She appears well-developed and well-nourished. No distress.   HENT:   Head: Normocephalic and atraumatic.   Eyes: Conjunctivae are normal. No scleral icterus.   Neck: Normal range of motion. Neck supple.   Cardiovascular: Normal rate, regular rhythm and normal heart sounds. Exam reveals no gallop and no friction rub.   No murmur heard.  Pulmonary/Chest: Effort normal and breath sounds normal. No respiratory distress. She has no wheezes. She has no rales.   Abdominal: Soft. She exhibits no distension. There is no tenderness.   Musculoskeletal: She exhibits no edema.   Neurological: She is alert and oriented to person, place, and time.   Skin: Skin is warm and dry. She is not diaphoretic.   Psychiatric: She has a normal mood and affect. Her behavior is normal.   Nursing note and vitals reviewed.    Right upper extremity arterial duplex performed in February 2019.  Report was reviewed and did not show any brachial artery pathology.  No evidence of aneurysm.    CT of the thoracic aorta performed was reviewed and showed " aortic calcification.    Labs performed in August 2019 were reviewed and showed normal creatinine and potassium.  LDL 43.    Assessment:     1. Essential hypertension  Basic Metabolic Panel   2. Coronary artery disease due to lipid rich plaque     3. Lower extremity edema     4. Cigarette smoker     5. Other hyperlipidemia     6. Encounter for long-term (current) use of high-risk medication         Medical Decision Making:  Today's Assessment / Status / Plan:      Coronary artery disease:  Hyperlipidemia:  No anginal symptoms.    LDL at goal.  Continue aspirin and Crestor at current dose.    Hypertension:  Lower extremity edema: New issue.  Patient does not have any appreciable lower extremity edema.  She reports mostly pedal edema.  I have assured her that this is unlikely to be secondary to a telmisartan however patient is hesitant to continue taking this medication.  I will therefore discontinue it and start her on hydrochlorothiazide 12.5 mg once daily instead.  She will get a Chem-7 in about 2 to 4 weeks for reevaluation of her potassium and her renal function.    Tobacco use: Patient continues to smoke on a daily basis.  I have discussed the benefits of smoking cessation.  Patient is not interested in quitting at this time.  I spent 4 minutes discussing smoking cessation.    Return to clinic in 1 year or earlier if needed.    Thank you for allowing me to participate in the care of this patient. Please do not hesitate to contact me with any questions.    Brandi Raymundo MD  Cardiologist  [facility]      PLEASE NOTE: This dictation was created using voice recognition software.

## 2019-09-17 NOTE — LETTER
"     [facility] [address],   [address] [place] [address]  Phone: [phone]  Fax: [phone]              Brittney Bruno  11/23/1933    Encounter Date: 9/17/2019    Brandi Raymundo M.D.          PROGRESS NOTE:  Chief Complaint   Patient presents with   • Coronary Artery Disease   • HTN (Controlled)       Subjective:   Brittney Bruno is a 85-year-old female presenting to clinic for follow-up on hypertension.    Pertinent history:  Coronary artery disease status post PCI to the RCA 2004  Hypertension  Hyperlipidemia  Tobacco use    Patient's main concern today is that she does not like taking the telmisartan.  She believes that it causes lower extremity edema.  She used to be on telmisartan amlodipine combination pill which caused significant lower extremity edema.  Since her amlodipine portion was discontinued, her edema improved but she continues to have pedal edema which is uncomfortable to her.  She also reports feeling discomfort in her toes due to the swelling.  She reports avoiding salt.    For hyperlipidemia she is currently on Crestor which she is tolerating well.  She has been smoking on a daily basis.    From a coronary artery disease standpoint, she denies any anginal symptoms.    Past Medical History:   Diagnosis Date   • Anxiety 11/7/2012   • Arthritis     degenerative osteoarthritis and osteoporosis   • CATARACT 2001    bilateral IOL   • Cervical radiculopathy 12/5/2018   • Chronic back pain     neck and low back pain, now resolved    • Chronic UTI 11/7/2012   • Closed wedge compression fracture of T6 vertebra (HCC) 2/20/2019   • Compression fracture of lumbar vertebra (HCC)    • Cubital tunnel syndrome on left 12/5/2018   • DDD (degenerative disc disease), lumbar    • Dental disorder     dentures   • Drug allergy, multiple 5/13/2017   • Heart valve disease     \"leaky valves\"   • Hepatitis C    • History of hepatitis C " 3/28/2019   • Hypertension    • Insomnia 4/18/2014   • Late onset Alzheimer's disease without behavioral disturbance 11/15/2017   • Myocardial infarct (HCC) 2002   • Osteoarthritis 2/6/2018   • Osteoporosis 9/21/2016   • Other hyperlipidemia 9/28/2018   • Renal disorder 2006    stone   • Sciatica 11/7/2012   • Vitamin D insufficiency 8/18/2017     Past Surgical History:   Procedure Laterality Date   • CERVICAL DISK AND FUSION ANTERIOR  9/15/2010    Performed by TERESA MALDONADO at SURGERY [facility]   • BUNIONECTOMY  1989    left   • TUBAL REANASTOMOSIS  1968    unsuccessful   • TUBAL COAGULATION LAPAROSCOPIC BILATERAL  1963   • TONSILLECTOMY  1955   • COLON RESECTION     • OTHER      colon tumors removed   • OTHER ABDOMINAL SURGERY  1999, 1998    hernia   • OTHER ORTHOPEDIC SURGERY       Family History   Problem Relation Age of Onset   • Stroke Mother    • Cancer Sister      Social History     Socioeconomic History   • Marital status: Single     Spouse name: Not on file   • Number of children: Not on file   • Years of education: Not on file   • Highest education level: Not on file   Occupational History   • Not on file   Social Needs   • Financial resource strain: Not on file   • Food insecurity:     Worry: Not on file     Inability: Not on file   • Transportation needs:     Medical: Not on file     Non-medical: Not on file   Tobacco Use   • Smoking status: Current Every Day Smoker     Packs/day: 0.25     Years: 70.00     Pack years: 17.50     Types: Cigarettes   • Smokeless tobacco: Never Used   Substance and Sexual Activity   • Alcohol use: No   • Drug use: No   • Sexual activity: Not Currently   Lifestyle   • Physical activity:     Days per week: Not on file     Minutes per session: Not on file   • Stress: Not on file   Relationships   • Social connections:     Talks on phone: Not on file     Gets together: Not on file     Attends Judaism service: Not on file     Active member of club or organization: Not  on file     Attends meetings of clubs or organizations: Not on file     Relationship status: Not on file   • Intimate partner violence:     Fear of current or ex partner: Not on file     Emotionally abused: Not on file     Physically abused: Not on file     Forced sexual activity: Not on file   Other Topics Concern   • Not on file   Social History Narrative   • Not on file     Allergies   Allergen Reactions   • Amitriptyline Rash, Itching and Vomiting   • Darvocet [Propoxyphene N-Apap] Rash, Itching and Vomiting   • Demerol Rash, Itching and Vomiting   • Fentanyl Itching   • Morphine Rash, Itching and Vomiting   • Penicillin G Swelling   • Percocet [Oxycodone-Acetaminophen] Itching and Vomiting   • Sulfamethoxazole W-Trimethoprim Anaphylaxis   • Ultracet [Tramadol-Acetaminophen] Itching and Vomiting   • Ultram [Tramadol Hcl] Rash, Itching and Vomiting   • Vicodin [Hydrocodone-Acetaminophen] Rash, Itching and Vomiting   • Fosamax      Jaw problems   • Codeine Itching   • Imodium [Loperamide] Diarrhea     Upset stomach and diarrhea   • Metoprolol Diarrhea     Diarrhea when taking this medication    • Milk Products Food Allergy    • Motrin [Ibuprofen]    • Sulfa Drugs      Outpatient Encounter Medications as of 9/17/2019   Medication Sig Dispense Refill   • hydroCHLOROthiazide (HYDRODIURIL) 12.5 MG tablet Take 1 Tab by mouth every day. 30 Tab 11   • rosuvastatin (CRESTOR) 5 MG Tab TAKE 1 TABLET BY MOUTH EVERY EVENING 90 Tab 3   • gabapentin (NEURONTIN) 100 MG Cap Take 1 Cap by mouth 3 times a day for 60 days. 90 Cap 1   • traZODone (DESYREL) 150 MG Tab TAKE 1 TABLET BY MOUTH AT BEDTIME (Patient taking differently: 150 mg. TAKE 1 TABLET BY MOUTH AT BEDTIME) 90 Tab 1   • ASPIRIN 81 PO Take  by mouth.     • Melatonin 10 MG Tab Take  by mouth.     • lidocaine (LIDODERM) 5 % Patch Apply 1 Patch to skin as directed every 24 hours. As needed for bone pain     • Hypromellose (ARTIFICIAL TEARS OP) by Ophthalmic route. As needed  "    • VOLTAREN 1 % Gel Apply 1 g to skin as directed 4 times a day. 1 Tube 3   • [DISCONTINUED] Telmisartan-amLODIPine 40-10 MG Tab TK 1 T PO QD  1   • [DISCONTINUED] telmisartan (MICARDIS) 40 MG Tab TAKE 1 TABLET BY MOUTH EVERY DAY 30 Tab 0   • [DISCONTINUED] rosuvastatin (CRESTOR) 5 MG Tab TAKE 1 TABLET BY MOUTH EVERY EVENING (Patient not taking: Reported on 9/17/2019) 90 Tab 1     No facility-administered encounter medications on file as of 9/17/2019.      Review of Systems   Constitutional: Negative for malaise/fatigue.   Respiratory: Negative for shortness of breath.    Cardiovascular: Positive for leg swelling. Negative for chest pain, palpitations, orthopnea and PND.   Gastrointestinal: Negative for abdominal pain.   Musculoskeletal: Negative for falls.   Neurological: Negative for dizziness and loss of consciousness.   Psychiatric/Behavioral: Negative for depression.   All other systems reviewed and are negative.       Objective:   /70 (BP Location: Right arm, Patient Position: Sitting)   Pulse 90   Ht 1.626 m (5' 4\")   Wt 61.2 kg (135 lb)   LMP  (LMP Unknown)   SpO2 94%   BMI 23.17 kg/m²      Physical Exam   Constitutional: She is oriented to person, place, and time. She appears well-developed and well-nourished. No distress.   HENT:   Head: Normocephalic and atraumatic.   Eyes: Conjunctivae are normal. No scleral icterus.   Neck: Normal range of motion. Neck supple.   Cardiovascular: Normal rate, regular rhythm and normal heart sounds. Exam reveals no gallop and no friction rub.   No murmur heard.  Pulmonary/Chest: Effort normal and breath sounds normal. No respiratory distress. She has no wheezes. She has no rales.   Abdominal: Soft. She exhibits no distension. There is no tenderness.   Musculoskeletal: She exhibits no edema.   Neurological: She is alert and oriented to person, place, and time.   Skin: Skin is warm and dry. She is not diaphoretic.   Psychiatric: She has a normal mood and " affect. Her behavior is normal.   Nursing note and vitals reviewed.    Right upper extremity arterial duplex performed in February 2019.  Report was reviewed and did not show any brachial artery pathology.  No evidence of aneurysm.    CT of the thoracic aorta performed was reviewed and showed aortic calcification.    Labs performed in August 2019 were reviewed and showed normal creatinine and potassium.  LDL 43.    Assessment:     1. Essential hypertension  Basic Metabolic Panel   2. Coronary artery disease due to lipid rich plaque     3. Lower extremity edema     4. Cigarette smoker     5. Other hyperlipidemia     6. Encounter for long-term (current) use of high-risk medication         Medical Decision Making:  Today's Assessment / Status / Plan:      Coronary artery disease:  Hyperlipidemia:  No anginal symptoms.    LDL at goal.  Continue aspirin and Crestor at current dose.    Hypertension:  Lower extremity edema: New issue.  Patient does not have any appreciable lower extremity edema.  She reports mostly pedal edema.  I have assured her that this is unlikely to be secondary to a telmisartan however patient is hesitant to continue taking this medication.  I will therefore discontinue it and start her on hydrochlorothiazide 12.5 mg once daily instead.  She will get a Chem-7 in about 2 to 4 weeks for reevaluation of her potassium and her renal function.    Tobacco use: Patient continues to smoke on a daily basis.  I have discussed the benefits of smoking cessation.  Patient is not interested in quitting at this time.  I spent 4 minutes discussing smoking cessation.    Return to clinic in 1 year or earlier if needed.    Thank you for allowing me to participate in the care of this patient. Please do not hesitate to contact me with any questions.    Brandi Raymundo MD  Cardiologist  [facility]      PLEASE NOTE: This dictation was created using voice recognition software.                              Jacobo Skelton M.D.  [address]  [facility] [address]  VIA In Basket

## 2019-10-31 ENCOUNTER — TELEPHONE (OUTPATIENT)
Dept: CARDIOLOGY | Facility: MEDICAL CENTER | Age: 84
End: 2019-10-31

## 2019-10-31 DIAGNOSIS — R60.0 LOWER EXTREMITY EDEMA: ICD-10-CM

## 2019-10-31 DIAGNOSIS — I10 ESSENTIAL HYPERTENSION: ICD-10-CM

## 2019-10-31 RX ORDER — HYDROCHLOROTHIAZIDE 12.5 MG/1
12.5 TABLET ORAL DAILY
Qty: 90 TAB | Refills: 3 | Status: SHIPPED | OUTPATIENT
Start: 2019-10-31 | End: 2020-01-13

## 2019-10-31 NOTE — TELEPHONE ENCOUNTER
Message   Received: Yesterday   Message Contents   JOEL Banks R.N.             Can you please order the hydrochlorothiazide?     Thank you    Previous Messages      ----- Message -----   From: Nikole Fragoso P.A.-C.   Sent: 10/22/2019   8:04 AM PDT   To: Jacobo Skelton M.D., JOEL Bankslo,   I saw patient today and she told me that she no longer has a BP medication. I see that she just saw Dr. Raymundo who recommended she take HCTZ. Pt does not have this prescribed and needs a RX. I have not RX'd this in case Dr. Raymundo wanted something else. Please contact patient.   Thank you for your attention to this matter.   Warm regards,   Nikole Fragoso PA-C           Per AA's last OV note, pt to start HCTZ 12.5mg once daily. Medication ordered and sent to pt's [facility] pharmacy in [place]. Pt notified via phone call, very appreciative.

## 2020-01-14 DIAGNOSIS — E78.49 OTHER HYPERLIPIDEMIA: ICD-10-CM

## 2020-01-14 RX ORDER — ROSUVASTATIN CALCIUM 5 MG/1
5 TABLET, COATED ORAL EVERY EVENING
Qty: 100 TAB | Refills: 3 | Status: SHIPPED | OUTPATIENT
Start: 2020-01-14 | End: 2020-09-03

## 2020-01-16 PROBLEM — M53.3 SACROILIAC JOINT PAIN: Status: ACTIVE | Noted: 2020-01-16

## 2020-04-21 PROBLEM — Z86.73 HISTORY OF STROKE WITHOUT RESIDUAL DEFICITS: Status: ACTIVE | Noted: 2020-04-21

## 2020-06-23 PROBLEM — R79.89 ABNORMAL TSH: Status: RESOLVED | Noted: 2017-08-18 | Resolved: 2020-06-23

## 2020-06-23 PROBLEM — I25.10 CORONARY ARTERY DISEASE DUE TO LIPID RICH PLAQUE: Status: RESOLVED | Noted: 2019-01-31 | Resolved: 2020-06-23

## 2020-06-23 PROBLEM — Z86.73 HISTORY OF STROKE WITHOUT RESIDUAL DEFICITS: Status: RESOLVED | Noted: 2020-04-21 | Resolved: 2020-06-23

## 2020-06-23 PROBLEM — R41.3 MEMORY IMPAIRMENT: Status: ACTIVE | Noted: 2020-06-23

## 2020-06-23 PROBLEM — E55.9 VITAMIN D INSUFFICIENCY: Status: RESOLVED | Noted: 2017-08-18 | Resolved: 2020-06-23

## 2020-06-23 PROBLEM — I25.83 CORONARY ARTERY DISEASE DUE TO LIPID RICH PLAQUE: Status: RESOLVED | Noted: 2019-01-31 | Resolved: 2020-06-23

## 2020-10-29 PROBLEM — Z63.4 BEREAVEMENT: Status: ACTIVE | Noted: 2020-10-29

## 2020-12-01 DIAGNOSIS — E78.49 OTHER HYPERLIPIDEMIA: ICD-10-CM

## 2020-12-04 RX ORDER — ROSUVASTATIN CALCIUM 5 MG/1
TABLET, COATED ORAL
Qty: 90 TAB | Refills: 0 | Status: SHIPPED | OUTPATIENT
Start: 2020-12-04 | End: 2021-02-26

## 2021-02-02 PROBLEM — N30.20 CHRONIC CYSTITIS: Status: ACTIVE | Noted: 2021-02-02

## 2021-02-02 PROBLEM — R31.21 ASYMPTOMATIC MICROSCOPIC HEMATURIA: Status: RESOLVED | Noted: 2019-05-16 | Resolved: 2021-02-02

## 2021-02-02 PROBLEM — N32.81 OAB (OVERACTIVE BLADDER): Status: ACTIVE | Noted: 2021-02-02

## 2021-10-25 PROBLEM — E87.6 HYPOKALEMIA: Status: ACTIVE | Noted: 2021-10-25

## 2022-04-06 PROBLEM — Z95.5 HISTORY OF HEART ARTERY STENT: Status: ACTIVE | Noted: 2022-04-06

## 2022-10-27 PROBLEM — F51.01 PRIMARY INSOMNIA: Status: ACTIVE | Noted: 2022-10-27

## 2022-11-21 PROBLEM — N18.32 STAGE 3B CHRONIC KIDNEY DISEASE: Status: ACTIVE | Noted: 2019-05-30

## 2023-10-12 PROBLEM — E87.6 HYPOKALEMIA: Status: RESOLVED | Noted: 2021-10-25 | Resolved: 2023-10-12
